# Patient Record
Sex: MALE | Race: WHITE | NOT HISPANIC OR LATINO | Employment: FULL TIME | URBAN - METROPOLITAN AREA
[De-identification: names, ages, dates, MRNs, and addresses within clinical notes are randomized per-mention and may not be internally consistent; named-entity substitution may affect disease eponyms.]

---

## 2017-01-31 ENCOUNTER — TELEPHONE (OUTPATIENT)
Dept: OTHER | Facility: CLINIC | Age: 52
End: 2017-01-31

## 2017-03-01 ENCOUNTER — OFFICE VISIT (OUTPATIENT)
Dept: URGENT CARE | Facility: URGENT CARE | Age: 52
End: 2017-03-01
Payer: COMMERCIAL

## 2017-03-01 VITALS
DIASTOLIC BLOOD PRESSURE: 78 MMHG | RESPIRATION RATE: 16 BRPM | HEART RATE: 71 BPM | WEIGHT: 248.8 LBS | TEMPERATURE: 98.6 F | SYSTOLIC BLOOD PRESSURE: 122 MMHG | OXYGEN SATURATION: 97 %

## 2017-03-01 DIAGNOSIS — J98.01 ACUTE BRONCHOSPASM: Primary | ICD-10-CM

## 2017-03-01 PROCEDURE — 99203 OFFICE O/P NEW LOW 30 MIN: CPT | Performed by: FAMILY MEDICINE

## 2017-03-01 RX ORDER — PREDNISONE 20 MG/1
20 TABLET ORAL DAILY
Qty: 5 TABLET | Refills: 0 | Status: SHIPPED | OUTPATIENT
Start: 2017-03-01 | End: 2020-12-08 | Stop reason: ALTCHOICE

## 2017-03-01 RX ORDER — ALBUTEROL SULFATE 90 UG/1
2 AEROSOL, METERED RESPIRATORY (INHALATION) EVERY 4 HOURS PRN
Qty: 1 INHALER | Refills: 0 | Status: SHIPPED | OUTPATIENT
Start: 2017-03-01 | End: 2020-12-08 | Stop reason: ALTCHOICE

## 2017-03-01 NOTE — MR AVS SNAPSHOT
"              After Visit Summary   3/1/2017    Aldair Mckeon    MRN: [id number]           Patient Information     Date Of Birth          1965        Visit Information        Provider Department      3/1/2017 8:05 PM Maxine Goncalves MD [facility]        Today's Diagnoses     Acute bronchospasm    -  1       Follow-ups after your visit        Who to contact     If you have questions or need follow up information about today's clinic visit or your schedule please contact [facility] directly at [phone].  Normal or non-critical lab and imaging results will be communicated to you by MyChart, letter or phone within 4 business days after the clinic has received the results. If you do not hear from us within 7 days, please contact the clinic through [email or web address] or phone. If you have a critical or abnormal lab result, we will notify you by phone as soon as possible.  Submit refill requests through [employer] or call your pharmacy and they will forward the refill request to us. Please allow 3 business days for your refill to be completed.          Additional Information About Your Visit        MyChart Information     [employer] lets you send messages to your doctor, view your test results, renew your prescriptions, schedule appointments and more. To sign up, go to [email or web address] . Click on \"Log in\" on the left side of the screen, which will take you to the Welcome page. Then click on \"Sign up Now\" on the right side of the page.     You will be asked to enter the access code listed below, as well as some personal information. Please follow the directions to create your username and password.     Your access code is: [email or web address]  Expires: 2017  8:52 PM     Your access code will  in 90 days. If you need help or a new code, please call your [facility] or [phone].        Care EveryWhere ID     This is your Care EveryWhere ID. This could be used by other " organizations to access your [place] medical records  [id number]        Your Vitals Were     Pulse Temperature Respirations Pulse Oximetry          71 98.6  F (37  C) (Oral) 16 97%         Blood Pressure from Last 3 Encounters:   03/01/17 122/78    Weight from Last 3 Encounters:   03/01/17 248 lb 12.8 oz (112.9 kg)              Today, you had the following     No orders found for display         Today's Medication Changes          These changes are accurate as of: 3/1/17  8:52 PM.  If you have any questions, ask your nurse or doctor.               Start taking these medicines.        Dose/Directions    albuterol 108 (90 BASE) MCG/ACT Inhaler   Commonly known as:  PROAIR HFA/PROVENTIL HFA/VENTOLIN HFA   Used for:  Acute bronchospasm   Started by:  Maxine Goncalves MD        Dose:  2 puff   Inhale 2 puffs into the lungs every 4 hours as needed for shortness of breath / dyspnea or wheezing Use with spacer   Quantity:  1 Inhaler   Refills:  0       predniSONE 20 MG tablet   Commonly known as:  DELTASONE   Used for:  Acute bronchospasm   Started by:  Maxine Goncalves MD        Dose:  20 mg   Take 1 tablet (20 mg) by mouth daily   Quantity:  5 tablet   Refills:  0       spacer/aero-hold chamber mask Chacha   Used for:  Acute bronchospasm   Started by:  Maxine Goncalves MD        1 Adult size spacer to use with MDI inhalers.   Quantity:  1 each   Refills:  0            Where to get your medicines      These medications were sent to [facility] [address] [place] [address] [address] [place] [address] AT SEC of [facility]  [address] [facility] [address]     Phone:  [phone]     albuterol 108 (90 BASE) MCG/ACT Inhaler    predniSONE 20 MG tablet    spacer/aero-hold chamber mask Chacha                Primary Care Provider Office Phone # Fax #    [facility] [phone] [address] [facility]. [facility] [address]        Thank you!     Thank you for  choosing [facility]  [facility]. Our goal is always to provide you with excellent care. Hearing back from our patients is one way we can continue to improve our services. Please take a few minutes to complete the written survey that you may receive in the mail after your visit with us. Thank you!             Your Updated Medication List - Protect others around you: Learn how to safely use, store and throw away your medicines at www.disposemymeds.org.          This list is accurate as of: 3/1/17  8:52 PM.  Always use your most recent med list.                   Brand Name Dispense Instructions for use    albuterol 108 (90 BASE) MCG/ACT Inhaler    PROAIR HFA/PROVENTIL HFA/VENTOLIN HFA    1 Inhaler    Inhale 2 puffs into the lungs every 4 hours as needed for shortness of breath / dyspnea or wheezing Use with spacer       aspirin 81 MG tablet      Take 81 mg by mouth daily       predniSONE 20 MG tablet    DELTASONE    5 tablet    Take 1 tablet (20 mg) by mouth daily       spacer/aero-hold chamber mask Chacha     1 each    1 Adult size spacer to use with MDI inhalers.

## 2017-03-02 NOTE — NURSING NOTE
Chief Complaint   Patient presents with     Cough     Exposure to chemical, irritated        Initial /78  Pulse 71  Temp 98.6  F (37  C) (Oral)  Resp 16  Wt 248 lb 12.8 oz (112.9 kg)  SpO2 97% There is no height or weight on file to calculate BMI.  Medication Reconciliation: complete   Krystin Dickey CMA

## 2017-03-02 NOTE — PROGRESS NOTES
SUBJECTIVE:                                                    Aldair Mckeon is a 51 year old male who presents to clinic today for the following health issues:      Respiratory Symptoms      Duration: X 1 hour    Description: shortness of breath, cough    Intensity:  moderate    Accompanying signs and symptoms: None    History (similar episodes/previous evaluation): None    Precipitating or alleviating factors: None    Therapies tried and outcome: None    Was using Roto Router and inhaled, and lungs got irritated and started coughing.     The sink was clogged patient used some roto router (chemical) and placed it in there  Patient put couple of doses  As he checked on it he had a whiff of it and start coughing and wheezing.  everytime he takes a deep breath he would cough  Patient opened up the windows and tried to air it out.  It was a lot worse initially to the point that he could barely cough  But now is improving but still feels tight when he takes in deep breathe.    Has had inhalers in the past which he sometimes needs when he gets a bronchitis    Patient is a .     Fever No  Sinus congestion/sinus pain No  Wheezing: Yes  Chest pain or exertional shortness of breath: NONE EXERTIONAL  Exposure to pertussis or pertussis like symptoms: No  Orthopnea, worsening edema, pnd: NO  Rash: NO  Tried OTC medications without relief  No hemoptysis.  Worsening symptoms hence patient came in to be seen     Problem list and histories reviewed & adjusted, as indicated.  No known medical problems  Additional history: as documented    Problem list, Medication list, Allergies, and Medical/Social/Surgical histories reviewed in [facility] and updated as appropriate.    ROS:  Constitutional, HEENT, cardiovascular, pulmonary, gi and gu systems are negative, except as otherwise noted.    OBJECTIVE:                                                    /78  Pulse 71  Temp 98.6  F (37  C) (Oral)  Resp 16  Wt 248 lb  12.8 oz (112.9 kg)  SpO2 97%  There is no height or weight on file to calculate BMI.  GENERAL: healthy, alert and no distress  EYES: pink palpebral conjunctiva, anicteric sclera  ENT: midline nasal septum normal ear exam. Normal  sinuses.   Mouth: moist buccal mucosa slight hyperemic posterior pharyngeal wall. No tonsillar enlargement or cellulitis  NECK: no adenopathy, no asymmetry, masses, or scars and thyroid normal to palpation  RESP: lungs clear to auscultation - No  rales, rhonchi  Occasional end expiratory wheeze.  CV: regular rate and rhythm, normal S1 S2, no S3 or S4,  No murmurs, click or rub  SKIN: no visible rashes noted  Pscyh: Appropriate mood and affect  MS: no gross musculoskeletal defects noted    Diagnostic Test Results:  none      ASSESSMENT/PLAN:                                                        ICD-10-CM    1. Acute bronchospasm J98.01 albuterol (PROAIR HFA/PROVENTIL HFA/VENTOLIN HFA) 108 (90 BASE) MCG/ACT Inhaler     Spacer/Aero-Holding Chambers (SPACER/AERO-HOLD CHAMBER MASK) LENNY     predniSONE (DELTASONE) 20 MG tablet     Prescribed with albuterol as needed wheezing and chest tightness  Prescribed with prednisone low dose for tonight. If symptoms remarkably improved tomorrow may discontinue.  Avoid recurrent exposure.  Adverse reactions of medications discussed.  Over the counter medications discussed.   Aware to come back in if with worsening symptoms or if no relief despite treatment plan  Patient voiced understanding and had no further questions.     MD Maxine Wu MD  [facility]

## 2020-12-08 ENCOUNTER — VIRTUAL VISIT (OUTPATIENT)
Dept: FAMILY MEDICINE | Facility: CLINIC | Age: 55
End: 2020-12-08
Payer: COMMERCIAL

## 2020-12-08 DIAGNOSIS — J01.90 ACUTE NON-RECURRENT SINUSITIS, UNSPECIFIED LOCATION: Primary | ICD-10-CM

## 2020-12-08 DIAGNOSIS — R05.9 COUGH: ICD-10-CM

## 2020-12-08 PROCEDURE — U0003 INFECTIOUS AGENT DETECTION BY NUCLEIC ACID (DNA OR RNA); SEVERE ACUTE RESPIRATORY SYNDROME CORONAVIRUS 2 (SARS-COV-2) (CORONAVIRUS DISEASE [COVID-19]), AMPLIFIED PROBE TECHNIQUE, MAKING USE OF HIGH THROUGHPUT TECHNOLOGIES AS DESCRIBED BY CMS-2020-01-R: HCPCS | Performed by: NURSE PRACTITIONER

## 2020-12-08 PROCEDURE — 99213 OFFICE O/P EST LOW 20 MIN: CPT | Mod: TEL | Performed by: NURSE PRACTITIONER

## 2020-12-08 RX ORDER — AMOXICILLIN 500 MG/1
1000 CAPSULE ORAL 3 TIMES DAILY
Qty: 60 CAPSULE | Refills: 0 | Status: SHIPPED | OUTPATIENT
Start: 2020-12-08 | End: 2020-12-18

## 2020-12-08 ASSESSMENT — ENCOUNTER SYMPTOMS
DIAPHORESIS: 1
SINUS PRESSURE: 1
ARTHRALGIAS: 1
SORE THROAT: 1
RHINORRHEA: 1
NAUSEA: 0
FATIGUE: 1
APPETITE CHANGE: 1
SHORTNESS OF BREATH: 0
HEADACHES: 1
MYALGIAS: 1
CHILLS: 1
WHEEZING: 0
COUGH: 1
FEVER: 0
VOMITING: 0
SINUS PAIN: 1
DIARRHEA: 1
EYE DISCHARGE: 0

## 2020-12-08 NOTE — LETTER
[facility]  [address] [place] [place] [address]  Phone: [phone]    December 8, 2020        Aldair Mckeon  [address] [facility] [address]        RE: Aldair Mckeon    Patient was seen and treated today at our clinic.  Please excuse him from work during his illness.    Aldair need to stay home until all three of these things are true:     Aldair feel better. Your cough, shortness of breath, or other symptoms are better.  and  It has been 10 days since you first felt sick.  and  Aldair have had no fever for at least 24 hours, without using medicine that lowers fevers.     If a lab test shows you do not have COVID-19 but you have symptoms, stay home until your symptoms are better and you do not have a fever.     Please contact me for questions or concerns.      Sincerely,        BEAU Lebron CNP

## 2020-12-08 NOTE — PROGRESS NOTES
"Aldair Mckeon is a 55 year old male who is being evaluated via a billable telephone visit.      The patient has been notified of following:     \"This telephone visit will be conducted via a call between you and your physician/provider. We have found that certain health care needs can be provided without the need for a physical exam.  This service lets us provide the care you need with a short phone conversation.  If a prescription is necessary we can send it directly to your pharmacy.  If lab work is needed we can place an order for that and you can then stop by our lab to have the test done at a later time.    Telephone visits are billed at different rates depending on your insurance coverage. During this emergency period, for some insurers they may be billed the same as an in-person visit.  Please reach out to your insurance provider with any questions.    If during the course of the call the physician/provider feels a telephone visit is not appropriate, you will not be charged for this service.\"    Patient has given verbal consent for Telephone visit?  Yes    What phone number would you like to be contacted at? [phone]    How would you like to obtain your AVS? Mail a copy    Subjective     Aldair Mckeon is a 55 year old male who presents via phone visit today for the following health issues:    HPI     Acute Illness  Acute illness concerns: Sinus infection  Onset/Duration: 1 week   Symptoms:  Fever: no  Chills/Sweats: YES  Headache (location?): YES  Sinus Pressure: YES  Conjunctivitis:  no  Ear Pain: YES: right  Rhinorrhea: YES  Congestion: no  Sore Throat: YES  Cough: YES  Wheeze: no  Decreased Appetite: YES  Nausea: no  Vomiting: no  Diarrhea: YES  Dysuria/Freq.: no  Dysuria or Hematuria: no  Fatigue/Achiness: YES  Sick/Strep Exposure: YES -      Therapies tried and outcome: OTC medication with little relief     Phone call completed due to COVID-19 outbreak.     Has not been feeling well " for the last week. Runny nose and cough started first. Was fatigued from working the week before. Is a  with [employer]. No shortness of breath. Sore throat in the AMs when wakes. Sore throat will get better during the day. Right ear is hurting. Has been using netti pot. When ear starts hurting directly related to doing the netti pot. Feels like is not as stable as has been on feet as has been in the past. Feels like balance is off a bit. 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 Taste is fine. No COVID exposure that is aware of. Is to work today and the next 3 days.       Review of Systems   Constitutional: Positive for appetite change (decreased), chills, diaphoresis and fatigue. Negative for fever.   HENT: Positive for ear pain, rhinorrhea, sinus pressure, sinus pain and sore throat. Negative for congestion.    Eyes: Negative for discharge.   Respiratory: Positive for cough. Negative for shortness of breath and wheezing.    Cardiovascular: Negative for chest pain.   Gastrointestinal: Positive for diarrhea. Negative for nausea and vomiting.   Musculoskeletal: Positive for arthralgias and myalgias.   Neurological: Positive for headaches.           Objective          Vitals:  No vitals were obtained today due to virtual visit.    healthy, alert and no distress  PSYCH: Alert and oriented times 3; coherent speech, normal   rate and volume, able to articulate logical thoughts, able   to abstract reason, no tangential thoughts, no hallucinations   or delusions  His affect is normal and pleasant  RESP: No cough, no audible wheezing, able to talk in full sentences  Remainder of exam unable to be completed due to telephone visits          Assessment & Plan     Acute non-recurrent sinusitis, unspecified location  We will treat empirically today based on symptoms of face pressure, ear pain and drainage for sinus infection.  Educated on use of antibiotic.  Notify if no improvement.  - amoxicillin (AMOXIL) 500 MG  capsule; Take 2 capsules (1,000 mg) by mouth 3 times daily for 10 days    Cough  Based on symptoms and possible exposure potential will set up for COVID-19 testing.  Rest, drink plenty of fluids.  Encouraged to self isolate/quarantine.  Work note written.  - Symptomatic COVID-19 Virus (Coronavirus) by PCR; Future        No follow-ups on file.    BEAU Lebron [facility]    Phone call duration:  10 minutes

## 2020-12-08 NOTE — PATIENT INSTRUCTIONS
Instructions for Patients  Your symptoms show that you may have coronavirus (COVID-19). This illness can cause fever, cough and trouble breathing. Many people get a mild case and get better on their own. Some people can get very sick.     Not all patients are tested for COVID-19. If you need to be tested, your care team will let you know.    How can I protect others?    Without a test, we can t know for sure that you have COVID-19. For safety, it s very important to follow these rules.    Stay home and away from others (self-isolate) until:    You ve had no fever--and no medicine that reduces fever--for 1 full day (24 hours), And     Your other symptoms have resolved (gotten better). For example, your cough or breathing has improved, And     At least 10 days have passed since your symptoms started.    During this time:    Stay in your own room (and use your own bathroom), if you can.    Stay away from others in your home. No hugging, kissing or shaking hands.    No visitors.    Don t go to work, school or anywhere else.     Clean  high touch  surfaces often (doorknobs, counters, handles, etc.). Use a household cleaning spray or wipes.    Cover your mouth and nose with a mask, tissue or washcloth to avoid spreading germs.    Wash your hands and face often. Use soap and water.    For more tips, go to https://www.cdc.gov/coronavirus/2019-ncov/downloads/10Things.pdf.    How can I take care of myself?    1. Get lots of rest. Drink extra fluids (unless a doctor has told you not to).     2. Take Tylenol (acetaminophen) for fever or pain. If you have liver or kidney problems, ask your family doctor if it's okay to take Tylenol.     Adults can take either:     650 mg (two 325 mg pills) every 4 to 6 hours, or     1,000 mg (two 500 mg pills) every 8 hours as needed.     Note: Don't take more than 3,000 mg in one day.   Acetaminophen is found in many medicines (both prescribed and over-the-counter medicines). Read all labels to  be sure you don't take too much.   For children, check the Tylenol bottle for the right dose. The dose is based on  the child's age or weight.    3. If you have other health problems (like cancer, heart failure, an organ transplant or severe kidney disease): Call your specialty clinic if you don't feel better in the next 2 days.    4. Know when to call 911: If your breathing is so bad that it keeps you from doing normal activities, call 911 or go to the emergency room. Tell them that you've been staying home and may have COVID-19.      Thank you for taking steps to prevent the spread of this virus.  o Limit your contact with others.  o Wear a simple mask to cover your cough.  o Wash your hands well and often.  o If you need medical care, go to [email or web address] or contact your health care provider.     For more about COVID-19 and caring for yourself at home, visit the CDC website at https://www.cdc.gov/coronavirus/2019-ncov/about/steps-when-sick.html.     To learn about care at [facility], please go to [email or web address].     [facility] clinical trials (COVID-19 research studies): [email or web address]-clinical-trials.    Below are the COVID-19 hotlines at the [facility] of Health ([facility]). Interpreters are available.     For health questions: Call [phone] or 1-556.489.9003 (7 a.m. to 7 p.m.)    For questions about schools and childcare: Call [phone] or [phone] (7 a.m. to 7 p.m.)

## 2020-12-09 LAB
SARS-COV-2 RNA SPEC QL NAA+PROBE: ABNORMAL
SPECIMEN SOURCE: ABNORMAL

## 2021-01-14 ENCOUNTER — HEALTH MAINTENANCE LETTER (OUTPATIENT)
Age: 56
End: 2021-01-14

## 2021-03-01 ENCOUNTER — TELEPHONE (OUTPATIENT)
Dept: FAMILY MEDICINE | Facility: CLINIC | Age: 56
End: 2021-03-01

## 2021-03-01 NOTE — LETTER
March 1, 2021      Aldair Mckeon  [address] [facility] [address]      Dear Aldair Mckeon    We care about your health and have reviewed your health plan including your medical conditions, medication list, and lab results.  Based on this review, it is recommended that you follow up regarding the following health topic(s):     -Colon Cancer Screening  -Wellness (Physical) Visit     We recommend you take the following action(s):  -schedule a WELLNESS (Physical) APPOINTMENT.  We will perform the following labs: Lipids (fasting cholesterol - nothing to eat except water and/or meds for 8-10 hours).    At your physical we will plan to further discuss your health maintenance items that are due.    Please call us at [phone] (or use [employer]) to address the above recommendations.     Thank you for trusting [facility] and we appreciate the opportunity to serve you.  We look forward to supporting your healthcare needs in the future.    Healthy Regards,      Your Health Care Team

## 2021-03-01 NOTE — TELEPHONE ENCOUNTER
Panel Management Review      Patient has the following on his problem list: None      Composite cancer screening  Chart review shows that this patient is due/due soon for the following Colonoscopy  Summary:    Patient is due/failing the following:     Health Maintenance Due   Topic Date Due     PREVENTIVE CARE VISIT  1965     ADVANCE CARE PLANNING  1965     COLORECTAL CANCER SCREENING  04/12/1975     HIV SCREENING  04/12/1980     HEPATITIS C SCREENING  04/12/1983     LIPID  04/12/2000     ZOSTER IMMUNIZATION (1 of 2) 04/12/2015     INFLUENZA VACCINE (1) 09/01/2020     PHQ-2  01/01/2021         Action needed:   Patient needs office visit for physical with fasting labs and discuss rest of health maintenance.    Type of outreach:    Sent letter.    Questions for provider review:    None                                                                                                                                    April TOMASA Hope       Chart routed to none .

## 2021-04-13 ENCOUNTER — OFFICE VISIT (OUTPATIENT)
Dept: FAMILY MEDICINE | Facility: CLINIC | Age: 56
End: 2021-04-13
Payer: COMMERCIAL

## 2021-04-13 VITALS
DIASTOLIC BLOOD PRESSURE: 82 MMHG | OXYGEN SATURATION: 96 % | RESPIRATION RATE: 18 BRPM | WEIGHT: 249 LBS | HEART RATE: 102 BPM | HEIGHT: 72 IN | TEMPERATURE: 97.9 F | SYSTOLIC BLOOD PRESSURE: 117 MMHG | BODY MASS INDEX: 33.72 KG/M2

## 2021-04-13 DIAGNOSIS — R33.9 INABILITY TO URINATE: ICD-10-CM

## 2021-04-13 DIAGNOSIS — K64.4 EXTERNAL HEMORRHOIDS: ICD-10-CM

## 2021-04-13 DIAGNOSIS — N41.0 ACUTE PROSTATITIS: Primary | ICD-10-CM

## 2021-04-13 DIAGNOSIS — Z12.12 SCREENING FOR MALIGNANT NEOPLASM OF THE RECTUM: ICD-10-CM

## 2021-04-13 LAB
ALBUMIN UR-MCNC: ABNORMAL MG/DL
APPEARANCE UR: CLEAR
BILIRUB UR QL STRIP: NEGATIVE
COLOR UR AUTO: YELLOW
GLUCOSE UR STRIP-MCNC: NEGATIVE MG/DL
HGB UR QL STRIP: NEGATIVE
KETONES UR STRIP-MCNC: NEGATIVE MG/DL
LEUKOCYTE ESTERASE UR QL STRIP: NEGATIVE
MUCOUS THREADS #/AREA URNS LPF: PRESENT /LPF
NITRATE UR QL: NEGATIVE
PH UR STRIP: 6 PH (ref 5–7)
RBC #/AREA URNS AUTO: ABNORMAL /HPF
SOURCE: ABNORMAL
SP GR UR STRIP: 1.02 (ref 1–1.03)
UROBILINOGEN UR STRIP-ACNC: 0.2 EU/DL (ref 0.2–1)
WBC #/AREA URNS AUTO: ABNORMAL /HPF

## 2021-04-13 PROCEDURE — 99213 OFFICE O/P EST LOW 20 MIN: CPT | Performed by: PHYSICIAN ASSISTANT

## 2021-04-13 PROCEDURE — 81001 URINALYSIS AUTO W/SCOPE: CPT | Performed by: PHYSICIAN ASSISTANT

## 2021-04-13 RX ORDER — TAMSULOSIN HYDROCHLORIDE 0.4 MG/1
0.4 CAPSULE ORAL DAILY
Qty: 14 CAPSULE | Refills: 0 | Status: SHIPPED | OUTPATIENT
Start: 2021-04-13 | End: 2021-04-27

## 2021-04-13 RX ORDER — DOXYCYCLINE 100 MG/1
100 CAPSULE ORAL 2 TIMES DAILY
Qty: 42 CAPSULE | Refills: 0 | Status: SHIPPED | OUTPATIENT
Start: 2021-04-13 | End: 2021-05-04

## 2021-04-13 ASSESSMENT — MIFFLIN-ST. JEOR: SCORE: 1997.46

## 2021-04-13 NOTE — PROGRESS NOTES
Assessment & Plan       ICD-10-CM    1. Acute prostatitis  N41.0 doxycycline hyclate (VIBRAMYCIN) 100 MG capsule     Urine Microscopic     tamsulosin (FLOMAX) 0.4 MG capsule   2. Inability to urinate  R33.9 *UA reflex to Microscopic and Culture ([place] [facility] (except [place] and [place])   3. External hemorrhoids  K64.4    4. Screening for malignant neoplasm of the rectum  Z12.12 GASTROENTEROLOGY ADULT REF PROCEDURE ONLY   I suspect based on his symptoms and exam that he has acute prostatitis.  We will start him on doxycycline 100 mg twice a day for 3 weeks.  Warning signs of side effects were discussed.  If his symptoms are not improving he may need to be seen in the emergency room.    Return in about 2 days (around 4/15/2021).    DEANNA Garcia [facility]    Marsha Quinteros is a 56 year old who presents for the following health issues     HPI     Genitourinary - Male  Onset/Duration: 4 days   Description:   Dysuria (painful urination): YES}  Hematuria (blood in urine): no  Frequency: no  Waking at night to urinate: YES  Hesitancy (delay in urine): YES  Retention (unable to empty): YES  Decrease in urinary flow: YES  Incontinence: no  Progression of Symptoms:  worsening  Accompanying Signs & Symptoms:  Fever: no  Back/Flank pain: YES  Urethral discharge: no  Testicle lumps/masses/pain: no  Nausea and/or vomiting: no  Abdominal pain: no  History:   History of frequent UTI s: no  History of kidney stones: YES  History of hernias: no  Personal or Family history of Prostate problems: YES- father thinks to have prostate cancer  Precipitating or alleviating factors: possible hemorrhoids/bowel issues - hemorrhoids in the past.   Therapies tried and outcome: used a suppository for above issues and that helped him urinate   Feels swollen around rectum.   3 days ago more the urge to have a BM and couldn't. Barely can urinate.     Can't get in a comfortable position.  3/10 constant pain. Sitting worse.     Last colonoscopy 5 yrs ago with [facility]. History of colon polyps.     Review of Systems   Constitutional, HEENT, cardiovascular, pulmonary, gi and gu systems are negative, except as otherwise noted.      Objective    /82   Pulse 102   Temp 97.9  F (36.6  C) (Tympanic)   Resp 18   Ht 1.829 m (6')   Wt 112.9 kg (249 lb)   SpO2 96%   BMI 33.77 kg/m    Body mass index is 33.77 kg/m .  Physical Exam   GENERAL: healthy, alert and no distress appears uncomfortable pacing around room.    (male): normal male genitalia without lesions or urethral discharge, no hernia  RECTAL (male): normal sphincter tone, small external nontender rectal masses, prostate unable to feel but he felt pressure during exam.     Results for orders placed or performed in visit on 04/13/21   *UA reflex to Microscopic and Culture ([place] [facility] (except [place] and [place])     Status: Abnormal    Specimen: Midstream Urine   Result Value Ref Range    Color Urine Yellow     Appearance Urine Clear     Glucose Urine Negative NEG^Negative mg/dL    Bilirubin Urine Negative NEG^Negative    Ketones Urine Negative NEG^Negative mg/dL    Specific Gravity Urine 1.025 1.003 - 1.035    Blood Urine Negative NEG^Negative    pH Urine 6.0 5.0 - 7.0 pH    Protein Albumin Urine Trace (A) NEG^Negative mg/dL    Urobilinogen Urine 0.2 0.2 - 1.0 EU/dL    Nitrite Urine Negative NEG^Negative    Leukocyte Esterase Urine Negative NEG^Negative    Source Midstream Urine    Urine Microscopic     Status: Abnormal   Result Value Ref Range    WBC Urine 0 - 5 OTO5^0 - 5 /HPF    RBC Urine O - 2 OTO2^O - 2 /HPF    Mucous Urine Present (A) NEG^Negative /LPF

## 2021-04-13 NOTE — LETTER
[facility]  [address] [facility] [address]  Phone: [phone]    April 13, 2021        Aldair Mckeon  [address] [facility] [address]          To whom it may concern:    RE: Aldair Mckeon    Patient was seen and treated today at our clinic and missed work.    Please contact me for questions or concerns.      Sincerely,        Doni Albarran PA-C

## 2021-04-15 ENCOUNTER — TRANSFERRED RECORDS (OUTPATIENT)
Dept: HEALTH INFORMATION MANAGEMENT | Facility: CLINIC | Age: 56
End: 2021-04-15

## 2021-05-13 DIAGNOSIS — Z11.59 ENCOUNTER FOR SCREENING FOR OTHER VIRAL DISEASES: ICD-10-CM

## 2021-05-24 DIAGNOSIS — Z11.59 ENCOUNTER FOR SCREENING FOR OTHER VIRAL DISEASES: ICD-10-CM

## 2021-05-24 LAB
SARS-COV-2 RNA RESP QL NAA+PROBE: NORMAL
SPECIMEN SOURCE: NORMAL

## 2021-05-24 PROCEDURE — U0005 INFEC AGEN DETEC AMPLI PROBE: HCPCS | Performed by: SURGERY

## 2021-05-24 PROCEDURE — U0003 INFECTIOUS AGENT DETECTION BY NUCLEIC ACID (DNA OR RNA); SEVERE ACUTE RESPIRATORY SYNDROME CORONAVIRUS 2 (SARS-COV-2) (CORONAVIRUS DISEASE [COVID-19]), AMPLIFIED PROBE TECHNIQUE, MAKING USE OF HIGH THROUGHPUT TECHNOLOGIES AS DESCRIBED BY CMS-2020-01-R: HCPCS | Performed by: SURGERY

## 2021-05-25 LAB
LABORATORY COMMENT REPORT: NORMAL
SARS-COV-2 RNA RESP QL NAA+PROBE: NEGATIVE
SPECIMEN SOURCE: NORMAL

## 2021-05-27 ENCOUNTER — HOSPITAL ENCOUNTER (OUTPATIENT)
Facility: AMBULATORY SURGERY CENTER | Age: 56
Discharge: HOME | End: 2021-05-27
Attending: SURGERY | Admitting: SURGERY
Payer: COMMERCIAL

## 2021-05-27 VITALS
HEART RATE: 84 BPM | TEMPERATURE: 97.5 F | RESPIRATION RATE: 16 BRPM | OXYGEN SATURATION: 96 % | SYSTOLIC BLOOD PRESSURE: 126 MMHG | DIASTOLIC BLOOD PRESSURE: 86 MMHG

## 2021-05-27 LAB — COLONOSCOPY: NORMAL

## 2021-05-27 PROCEDURE — 88305 TISSUE EXAM BY PATHOLOGIST: CPT | Performed by: PATHOLOGY

## 2021-05-27 PROCEDURE — G8918 PT W/O PREOP ORDER IV AB PRO: HCPCS

## 2021-05-27 PROCEDURE — 45385 COLONOSCOPY W/LESION REMOVAL: CPT

## 2021-05-27 PROCEDURE — 99152 MOD SED SAME PHYS/QHP 5/>YRS: CPT | Mod: 59 | Performed by: SURGERY

## 2021-05-27 PROCEDURE — 45385 COLONOSCOPY W/LESION REMOVAL: CPT | Mod: PT | Performed by: SURGERY

## 2021-05-27 PROCEDURE — G8907 PT DOC NO EVENTS ON DISCHARG: HCPCS

## 2021-05-27 RX ORDER — FLUMAZENIL 0.1 MG/ML
0.2 INJECTION, SOLUTION INTRAVENOUS
Status: DISCONTINUED | OUTPATIENT
Start: 2021-05-27 | End: 2021-05-28 | Stop reason: HOSPADM

## 2021-05-27 RX ORDER — LIDOCAINE 40 MG/G
CREAM TOPICAL
Status: DISCONTINUED | OUTPATIENT
Start: 2021-05-27 | End: 2021-05-28 | Stop reason: HOSPADM

## 2021-05-27 RX ORDER — NALOXONE HYDROCHLORIDE 0.4 MG/ML
0.2 INJECTION, SOLUTION INTRAMUSCULAR; INTRAVENOUS; SUBCUTANEOUS
Status: DISCONTINUED | OUTPATIENT
Start: 2021-05-27 | End: 2021-05-28 | Stop reason: HOSPADM

## 2021-05-27 RX ORDER — PROCHLORPERAZINE MALEATE 10 MG
10 TABLET ORAL EVERY 6 HOURS PRN
Status: DISCONTINUED | OUTPATIENT
Start: 2021-05-27 | End: 2021-05-28 | Stop reason: HOSPADM

## 2021-05-27 RX ORDER — NALOXONE HYDROCHLORIDE 0.4 MG/ML
0.4 INJECTION, SOLUTION INTRAMUSCULAR; INTRAVENOUS; SUBCUTANEOUS
Status: DISCONTINUED | OUTPATIENT
Start: 2021-05-27 | End: 2021-05-28 | Stop reason: HOSPADM

## 2021-05-27 RX ORDER — ONDANSETRON 2 MG/ML
4 INJECTION INTRAMUSCULAR; INTRAVENOUS EVERY 6 HOURS PRN
Status: DISCONTINUED | OUTPATIENT
Start: 2021-05-27 | End: 2021-05-28 | Stop reason: HOSPADM

## 2021-05-27 RX ORDER — ONDANSETRON 4 MG/1
4 TABLET, ORALLY DISINTEGRATING ORAL EVERY 6 HOURS PRN
Status: DISCONTINUED | OUTPATIENT
Start: 2021-05-27 | End: 2021-05-28 | Stop reason: HOSPADM

## 2021-05-27 RX ORDER — FENTANYL CITRATE 50 UG/ML
INJECTION, SOLUTION INTRAMUSCULAR; INTRAVENOUS PRN
Status: DISCONTINUED | OUTPATIENT
Start: 2021-05-27 | End: 2021-05-27 | Stop reason: HOSPADM

## 2021-05-27 RX ORDER — ONDANSETRON 2 MG/ML
4 INJECTION INTRAMUSCULAR; INTRAVENOUS
Status: DISCONTINUED | OUTPATIENT
Start: 2021-05-27 | End: 2021-05-28 | Stop reason: HOSPADM

## 2021-06-01 LAB — COPATH REPORT: NORMAL

## 2021-06-11 ENCOUNTER — MYC MEDICAL ADVICE (OUTPATIENT)
Dept: FAMILY MEDICINE | Facility: CLINIC | Age: 56
End: 2021-06-11

## 2021-10-24 ENCOUNTER — HEALTH MAINTENANCE LETTER (OUTPATIENT)
Age: 56
End: 2021-10-24

## 2022-02-13 ENCOUNTER — HEALTH MAINTENANCE LETTER (OUTPATIENT)
Age: 57
End: 2022-02-13

## 2022-07-14 ENCOUNTER — OFFICE VISIT (OUTPATIENT)
Dept: URGENT CARE | Facility: URGENT CARE | Age: 57
End: 2022-07-14
Payer: COMMERCIAL

## 2022-07-14 VITALS
OXYGEN SATURATION: 97 % | BODY MASS INDEX: 35.05 KG/M2 | HEART RATE: 85 BPM | RESPIRATION RATE: 12 BRPM | SYSTOLIC BLOOD PRESSURE: 122 MMHG | TEMPERATURE: 99.9 F | WEIGHT: 258.4 LBS | DIASTOLIC BLOOD PRESSURE: 80 MMHG

## 2022-07-14 DIAGNOSIS — R07.9 CHEST PAIN, UNSPECIFIED TYPE: Primary | ICD-10-CM

## 2022-07-14 PROCEDURE — 99214 OFFICE O/P EST MOD 30 MIN: CPT | Performed by: FAMILY MEDICINE

## 2022-07-14 PROCEDURE — 93000 ELECTROCARDIOGRAM COMPLETE: CPT | Performed by: FAMILY MEDICINE

## 2022-07-14 RX ORDER — IBUPROFEN 200 MG
200 TABLET ORAL EVERY 4 HOURS PRN
COMMUNITY
End: 2024-01-02

## 2022-07-15 NOTE — PROGRESS NOTES
Assessment & Plan     Chest pain, unspecified type  ddx suggests possible PE. He needs ER evaluation discussed safest/least risky means would be ambulance transfer but he will do private vehicle. He appears relatively stable for this. Stressed iimportance of no delay. Relayed info to ER at [facility].   - EKG 12-lead complete w/read - Clinics       [id number]}    No follow-ups on file.    Tripp Curran MD  [facility]    ------------------------------------------------------------------------  Subjective     Aldair Mckeon presents to clinic today for the following health issues:  chief complaint  HPI  Reports 4 days of symptoms including left calf pain early in the week then cough and right sided chest pain and shortness of breath starting. This may have improved a bit then today worsening shortness of breath particulary when driving. The calf pain and right sided chest pain continue as well. symptoms even present while at rest.     Review of Systems        Objective    /80   Pulse 85   Temp 99.9  F (37.7  C) (Tympanic)   Resp 12   Wt 117.2 kg (258 lb 6.4 oz)   SpO2 97%   BMI 35.05 kg/m    Physical Exam   GENERAL: healthy, alert and no distress  RESP: lungs clear to auscultation - no rales, rhonchi or wheezes  Cv: rrr  MS: right calf feels tight but no palpable cords. It is tender over the muscle belly.     ekg normal sinus rhythm.

## 2022-10-10 ENCOUNTER — HEALTH MAINTENANCE LETTER (OUTPATIENT)
Age: 57
End: 2022-10-10

## 2023-03-25 ENCOUNTER — HEALTH MAINTENANCE LETTER (OUTPATIENT)
Age: 58
End: 2023-03-25

## 2023-03-28 ENCOUNTER — ALLIED HEALTH/NURSE VISIT (OUTPATIENT)
Dept: FAMILY MEDICINE | Facility: CLINIC | Age: 58
End: 2023-03-28
Payer: COMMERCIAL

## 2023-03-28 DIAGNOSIS — Z23 NEED FOR TDAP VACCINATION: Primary | ICD-10-CM

## 2023-03-28 PROCEDURE — 90471 IMMUNIZATION ADMIN: CPT

## 2023-03-28 PROCEDURE — 99207 PR NO CHARGE NURSE ONLY: CPT

## 2023-03-28 PROCEDURE — 90715 TDAP VACCINE 7 YRS/> IM: CPT

## 2023-04-06 ASSESSMENT — ENCOUNTER SYMPTOMS
FEVER: 0
ARTHRALGIAS: 0
NAUSEA: 0
PALPITATIONS: 0
HEARTBURN: 0
JOINT SWELLING: 0
CHILLS: 0
WEAKNESS: 0
HEMATOCHEZIA: 0
DIZZINESS: 0
HEMATURIA: 0
CONSTIPATION: 0
HEADACHES: 0
DYSURIA: 0
PARESTHESIAS: 0
EYE PAIN: 0
NERVOUS/ANXIOUS: 0
DIARRHEA: 0
MYALGIAS: 0
SHORTNESS OF BREATH: 0
COUGH: 1
ABDOMINAL PAIN: 0
FREQUENCY: 1
SORE THROAT: 0

## 2023-04-13 ENCOUNTER — OFFICE VISIT (OUTPATIENT)
Dept: FAMILY MEDICINE | Facility: CLINIC | Age: 58
End: 2023-04-13
Payer: COMMERCIAL

## 2023-04-13 VITALS
WEIGHT: 263 LBS | RESPIRATION RATE: 16 BRPM | TEMPERATURE: 97.9 F | SYSTOLIC BLOOD PRESSURE: 134 MMHG | DIASTOLIC BLOOD PRESSURE: 80 MMHG | OXYGEN SATURATION: 97 % | HEART RATE: 72 BPM | HEIGHT: 72 IN | BODY MASS INDEX: 35.62 KG/M2

## 2023-04-13 DIAGNOSIS — Z00.00 ENCOUNTER FOR PREVENTIVE CARE: Primary | ICD-10-CM

## 2023-04-13 DIAGNOSIS — E66.01 MORBID OBESITY (H): ICD-10-CM

## 2023-04-13 DIAGNOSIS — G47.33 OSA (OBSTRUCTIVE SLEEP APNEA): ICD-10-CM

## 2023-04-13 DIAGNOSIS — R09.81 NASAL CONGESTION: ICD-10-CM

## 2023-04-13 PROCEDURE — 80061 LIPID PANEL: CPT | Performed by: PHYSICIAN ASSISTANT

## 2023-04-13 PROCEDURE — 80053 COMPREHEN METABOLIC PANEL: CPT | Performed by: PHYSICIAN ASSISTANT

## 2023-04-13 PROCEDURE — 99213 OFFICE O/P EST LOW 20 MIN: CPT | Mod: 25 | Performed by: PHYSICIAN ASSISTANT

## 2023-04-13 PROCEDURE — 36415 COLL VENOUS BLD VENIPUNCTURE: CPT | Performed by: PHYSICIAN ASSISTANT

## 2023-04-13 PROCEDURE — 86803 HEPATITIS C AB TEST: CPT | Performed by: PHYSICIAN ASSISTANT

## 2023-04-13 PROCEDURE — 99396 PREV VISIT EST AGE 40-64: CPT | Performed by: PHYSICIAN ASSISTANT

## 2023-04-13 PROCEDURE — G0103 PSA SCREENING: HCPCS | Performed by: PHYSICIAN ASSISTANT

## 2023-04-13 ASSESSMENT — ENCOUNTER SYMPTOMS
MYALGIAS: 0
ABDOMINAL PAIN: 0
NAUSEA: 0
DIZZINESS: 0
ARTHRALGIAS: 0
DIARRHEA: 0
COUGH: 1
HEARTBURN: 0
EYE PAIN: 0
CONSTIPATION: 0
PALPITATIONS: 0
SORE THROAT: 0
WEAKNESS: 0
SHORTNESS OF BREATH: 0
NERVOUS/ANXIOUS: 0
JOINT SWELLING: 0
FEVER: 0
HEMATOCHEZIA: 0
DYSURIA: 0
CHILLS: 0
PARESTHESIAS: 0
HEADACHES: 0
FREQUENCY: 1
HEMATURIA: 0

## 2023-04-13 ASSESSMENT — PAIN SCALES - GENERAL: PAINLEVEL: NO PAIN (0)

## 2023-04-13 NOTE — PROGRESS NOTES
SUBJECTIVE:   CC: Aldair is an 58 year old who presents for preventative health visit.       4/13/2023     2:31 PM   Additional Questions   Roomed by Amna   Accompanied by Self         4/13/2023     2:31 PM   Patient Reported Additional Medications   Patient reports taking the following new medications no new meds     Patient has been advised of split billing requirements and indicates understanding: Yes  Healthy Habits:     Getting at least 3 servings of Calcium per day:  Yes    Bi-annual eye exam:  Yes    Dental care twice a year:  NO    Sleep apnea or symptoms of sleep apnea:  Sleep apnea    Diet:  Regular (no restrictions)    Frequency of exercise:  2-3 days/week    Duration of exercise:  15-30 minutes    Taking medications regularly:  Yes    Medication side effects:  None    PHQ-2 Total Score: 0    Additional concerns today:  No    History of chronic nasal congestion and constant throat clearing and need to cough to clear throat.   History of deviated septum and had surgery with no help.   Increase symptoms with allergies season in summer for him.   occ use of antihistamine, nasal rinses.     Has BIPIN and uses CPAP.     Today's PHQ-2 Score:       4/6/2023    10:42 AM   PHQ-2 ( 1999 [employer])   Q1: Little interest or pleasure in doing things 0   Q2: Feeling down, depressed or hopeless 0   PHQ-2 Score 0   Q1: Little interest or pleasure in doing things Not at all   Q2: Feeling down, depressed or hopeless Not at all   PHQ-2 Score 0       Have you ever done Advance Care Planning? (For example, a Health Directive, POLST, or a discussion with a medical provider or your loved ones about your wishes): No, advance care planning information given to patient to review.  Patient declined advance care planning discussion at this time.    Social History     Tobacco Use     Smoking status: Never     Smokeless tobacco: Never   Vaping Use     Vaping status: Not on file   Substance Use Topics     Alcohol use: Not Currently              4/6/2023    10:41 AM   Alcohol Use   Prescreen: >3 drinks/day or >7 drinks/week? No       Last PSA: No results found for: PSA    Reviewed orders with patient. Reviewed health maintenance and updated orders accordingly - Yes  Lab work is in process  Labs reviewed in EPIC  BP Readings from Last 3 Encounters:   04/13/23 134/80   07/14/22 122/80   05/27/21 126/86    Wt Readings from Last 3 Encounters:   04/13/23 119.3 kg (263 lb)   07/14/22 117.2 kg (258 lb 6.4 oz)   04/13/21 112.9 kg (249 lb)                  Patient Active Problem List   Diagnosis     External hemorrhoids     BIPIN (obstructive sleep apnea)     Morbid obesity (H)     Past Surgical History:   Procedure Laterality Date     AS DRAINAGE RECTAL ABSCESS       COLONOSCOPY WITH CO2 INSUFFLATION N/A 5/27/2021    Procedure: COLONOSCOPY, WITH CO2 INSUFFLATION;  Surgeon: Parag Haider DO;  Location: [facility] OR     deviated septum       FINGER SURGERY       KNEE SURGERY       ORTHOPEDIC SURGERY      Right Knee ACL       Social History     Tobacco Use     Smoking status: Never     Smokeless tobacco: Never   Vaping Use     Vaping status: Not on file   Substance Use Topics     Alcohol use: Not Currently     Family History   Problem Relation Age of Onset     Diabetes Mother      Prostate Cancer Father 70         Current Outpatient Medications   Medication Sig Dispense Refill     aspirin 81 MG tablet Take 81 mg by mouth daily (Patient not taking: Reported on 7/14/2022)       ibuprofen (ADVIL/MOTRIN) 200 MG tablet Take 200 mg by mouth every 4 hours as needed for mild pain       No Known Allergies    Reviewed and updated as needed this visit by clinical staff   Tobacco  Allergies  Meds  Problems  Med Hx  Surg Hx  Fam Hx          Reviewed and updated as needed this visit by Provider   Tobacco  Allergies  Meds  Problems  Med Hx  Surg Hx  Fam Hx           Past Medical History:   Diagnosis Date     Sleep apnea       Past Surgical History:    Procedure Laterality Date     AS DRAINAGE RECTAL ABSCESS       COLONOSCOPY WITH CO2 INSUFFLATION N/A 5/27/2021    Procedure: COLONOSCOPY, WITH CO2 INSUFFLATION;  Surgeon: Parag Haider DO;  Location: [facility] OR     deviated septum       FINGER SURGERY       KNEE SURGERY       ORTHOPEDIC SURGERY      Right Knee ACL       Review of Systems   Constitutional: Negative for chills and fever.   HENT: Negative for congestion, ear pain, hearing loss and sore throat.    Eyes: Negative for pain and visual disturbance.   Respiratory: Positive for cough. Negative for shortness of breath.    Cardiovascular: Negative for chest pain, palpitations and peripheral edema.   Gastrointestinal: Negative for abdominal pain, constipation, diarrhea, heartburn, hematochezia and nausea.   Genitourinary: Positive for frequency. Negative for dysuria, genital sores, hematuria, impotence, penile discharge and urgency.   Musculoskeletal: Negative for arthralgias, joint swelling and myalgias.   Skin: Negative for rash.   Neurological: Negative for dizziness, weakness, headaches and paresthesias.   Psychiatric/Behavioral: Negative for mood changes. The patient is not nervous/anxious.        OBJECTIVE:   /80   Pulse 72   Temp 97.9  F (36.6  C) (Tympanic)   Resp 16   Ht 1.829 m (6')   Wt 119.3 kg (263 lb)   SpO2 97%   BMI 35.67 kg/m      Physical Exam  GENERAL: healthy, alert and no distress  EYES: Eyes grossly normal to inspection, PERRL and conjunctivae and sclerae normal  HENT: ear canals and TM's normal, nose and mouth without ulcers or lesions  NECK: no adenopathy, no asymmetry, masses, or scars and thyroid normal to palpation  RESP: lungs clear to auscultation - no rales, rhonchi or wheezes  CV: regular rate and rhythm, normal S1 S2, no S3 or S4, no murmur, click or rub, no peripheral edema and peripheral pulses strong  ABDOMEN: soft, nontender, no hepatosplenomegaly, no masses and bowel sounds normal   (male): normal  male genitalia without lesions or urethral discharge, no hernia  MS: no gross musculoskeletal defects noted, no edema  SKIN: no suspicious lesions or rashes  NEURO: Normal strength and tone, mentation intact and speech normal  PSYCH: mentation appears normal, affect normal/bright    Diagnostic Test Results:  Labs reviewed in Epic    ASSESSMENT/PLAN:       ICD-10-CM    1. Encounter for preventive care  Z00.00 Hepatitis C Screen Reflex to HCV RNA Quant and Genotype     Comprehensive metabolic panel (BMP + Alb, Alk Phos, ALT, AST, Total. Bili, TP)     Lipid panel reflex to direct LDL Fasting     PSA, screen     Hepatitis C Screen Reflex to HCV RNA Quant and Genotype     Comprehensive metabolic panel (BMP + Alb, Alk Phos, ALT, AST, Total. Bili, TP)     Lipid panel reflex to direct LDL Fasting     PSA, screen     CANCELED: Lipid panel reflex to direct LDL Non-fasting      2. Nasal congestion  R09.81 Adult ENT  Referral     OFFICE/OUTPT VISIT,EST,LEVL III      3. BIPIN (obstructive sleep apnea)  G47.33       4. Morbid obesity (H)  E66.01       1. Work on Healthy diet and exercise. Getting heart rate elevated for 30 mins most days of week.  Labs pending  2. Follow up  With ENT for 2nd opinion.      COUNSELING:   Reviewed preventive health counseling, as reflected in patient instructions       Regular exercise       Healthy diet/nutrition       Vision screening      BMI:   Estimated body mass index is 35.67 kg/m  as calculated from the following:    Height as of this encounter: 1.829 m (6').    Weight as of this encounter: 119.3 kg (263 lb).   Weight management plan: Discussed healthy diet and exercise guidelines      He reports that he has never smoked. He has never used smokeless tobacco.            DEANNA Garcia [facility]

## 2023-04-14 LAB
ALBUMIN SERPL-MCNC: 3.9 G/DL (ref 3.4–5)
ALP SERPL-CCNC: 80 U/L (ref 40–150)
ALT SERPL W P-5'-P-CCNC: 52 U/L (ref 0–70)
ANION GAP SERPL CALCULATED.3IONS-SCNC: 2 MMOL/L (ref 3–14)
AST SERPL W P-5'-P-CCNC: 32 U/L (ref 0–45)
BILIRUB SERPL-MCNC: 0.5 MG/DL (ref 0.2–1.3)
BUN SERPL-MCNC: 13 MG/DL (ref 7–30)
CALCIUM SERPL-MCNC: 8.1 MG/DL (ref 8.5–10.1)
CHLORIDE BLD-SCNC: 111 MMOL/L (ref 94–109)
CHOLEST SERPL-MCNC: 184 MG/DL
CO2 SERPL-SCNC: 25 MMOL/L (ref 20–32)
CREAT SERPL-MCNC: 0.84 MG/DL (ref 0.66–1.25)
FASTING STATUS PATIENT QL REPORTED: NO
GFR SERPL CREATININE-BSD FRML MDRD: >90 ML/MIN/1.73M2
GLUCOSE BLD-MCNC: 115 MG/DL (ref 70–99)
HCV AB SERPL QL IA: NONREACTIVE
HDLC SERPL-MCNC: 29 MG/DL
LDLC SERPL CALC-MCNC: 94 MG/DL
NONHDLC SERPL-MCNC: 155 MG/DL
POTASSIUM BLD-SCNC: 3.8 MMOL/L (ref 3.4–5.3)
PROT SERPL-MCNC: 7.3 G/DL (ref 6.8–8.8)
PSA SERPL-MCNC: 0.8 UG/L (ref 0–4)
SODIUM SERPL-SCNC: 138 MMOL/L (ref 133–144)
TRIGL SERPL-MCNC: 306 MG/DL

## 2023-05-15 ENCOUNTER — OFFICE VISIT (OUTPATIENT)
Dept: URGENT CARE | Facility: URGENT CARE | Age: 58
End: 2023-05-15
Payer: COMMERCIAL

## 2023-05-15 VITALS
HEART RATE: 69 BPM | BODY MASS INDEX: 35.67 KG/M2 | DIASTOLIC BLOOD PRESSURE: 82 MMHG | RESPIRATION RATE: 12 BRPM | OXYGEN SATURATION: 95 % | TEMPERATURE: 98.2 F | SYSTOLIC BLOOD PRESSURE: 130 MMHG | WEIGHT: 263 LBS

## 2023-05-15 DIAGNOSIS — H61.23 BILATERAL IMPACTED CERUMEN: Primary | ICD-10-CM

## 2023-05-15 PROBLEM — K61.2 ANORECTAL ABSCESS: Status: ACTIVE | Noted: 2021-04-16

## 2023-05-15 PROBLEM — A41.9 SEPSIS (H): Status: ACTIVE | Noted: 2021-04-16

## 2023-05-15 PROBLEM — K61.1 PERIRECTAL ABSCESS: Status: ACTIVE | Noted: 2021-04-15

## 2023-05-15 PROCEDURE — 69209 REMOVE IMPACTED EAR WAX UNI: CPT | Mod: 50 | Performed by: NURSE PRACTITIONER

## 2023-05-15 RX ORDER — TAMSULOSIN HYDROCHLORIDE 0.4 MG/1
1 CAPSULE ORAL DAILY
COMMUNITY
End: 2023-07-22

## 2023-05-15 NOTE — PROGRESS NOTES
Assessment & Plan      Diagnosis Comments   1. Bilateral impacted cerumen  DC REMOVAL IMPACTED CERUMEN IRRIGATION/LVG UNILAT         Patient Instructions   Ear wax build-up: Use over-the-counter Debrox drops - Instill 5 to 10 drops daily up to 4 days then use twice a week as needed.   Avoid use of Q-tips as these tend to lodge the wax in the ear and can cause a hard plug of wax to form in the ear canal.    Follow up as needed.        Earwax Removal    The ear canal makes earwax from the canal s lining. The ears make wax to lubricate and protect the ear canal. The ear canal is the tube that connects the middle ear to the outside of the ear. The wax protects the ear from bacteria, infection, and damage from water or trauma.   The wax that forms in the canal naturally moves toward the outside of the ear and falls out. In some cases, the ear may make too much wax. If the wax causes problems or keeps the healthcare provider from seeing into the ear, the extra wax may be removed.   Too much wax can affect your hearing. It can cause itching. In rare cases, it can be painful. Earwax should not be removed unless it is causing a problem. You should not stick objects such as cotton swabs into your ear to remove wax unless told to do so by your healthcare provider.   Healthcare providers can remove earwax safely. Often flushing the earwax out with water (irrigation) and syringing will help. Sometimes devices or suction are used to remove the wax. It is important to stay still during the procedure to prevent damage to the ear canal. But removing earwax generally doesn t hurt. You won't need anesthesia or pain medicine when the provider removes the earwax.   A number of conditions lead to earwax buildup. These include some skin problems, a narrow ear canal, or ears that make too much earwax. Using cotton swabs in the canal pushes earwax deeper into the ear and helps lead to the buildup of earwax.   Home care    The healthcare  "provider may advise mineral oil or an over-the-counter (OTC) eardrop to use at home to soften the earwax. He or she may also advise a home irrigation or syringing kit. Use these products only if the provider advises them. Carefully follow the instructions given.    Don t use mineral oil or OTC eardrops if you might have an ear infection or a burst (ruptured) eardrum. Tell your provider right away if you have diabetes or an immune disorder.    Don t use cotton swabs in your ears. Cotton swabs may push wax deeper into the ear canal or damage the eardrum. Use cotton gauze or a wet washcloth to gently remove wax on the outside of the ear and around the opening to the ear canal.    Don't use any probing device or object such as cotton-tipped swabs or mike pins to clean the inside of your ears.    Don t use ear candles to clean your ears. Candling can be dangerous. It can burn the ear canal. It can also make the condition worse instead of better.    Don t use cold water to rinse the ear. This will make you dizzy. If your provider tells you to rinse your ear, use only warm water or follow his or her instructions.    Check the ear for signs of infection or irritation (listed below under \"When to get medical advice\").  Steps for using eardrops  1. Warm the medicine bottle by rubbing it between your hands for a few minutes.  2. Lie down on your side, with the affected ear up.  3. Place the advised number of drops in the ear. Wet a cotton ball with the medicine. Gently put the cotton ball into the ear opening.    Follow-up care  Follow up with your healthcare provider, or as advised.   When to get medical advice   Call your healthcare provider right away if you have:     Ear pain that gets worse    Fever of 100.4F F (38 C) or higher, or as directed by your healthcare provider    Worsening wax buildup    Severe pain, dizziness, or nausea    Bleeding from the ear    Hearing problems    Signs of irritation from the eardrops, " such as burning, stinging, or swelling and soreness    Foul-smelling fluid draining from the ear    Signs of infection such as outer ear swelling, redness, or soreness    Headache, neck pain, or stiff neck  [employer] last reviewed this educational content on 6/1/2020 2000-2021 [employer]. All rights reserved. This information is not intended as a substitute for professional medical care. Always follow your healthcare professional's instructions.                  Perlita Gillette, BE[facility] [place]    Marsha Quinteros is a 58 year old male who presents to clinic today for the following health issues:  Chief Complaint   Patient presents with     Ear Problem     Per pt Sx Thursday morning , left  Ear plugged and Painful . Patient tried some debrox and flushing.     HPI    Patient states that he noted approximately 4 days ago with plugged ear feelings mainly on the left side however it is bilateral.  He does use Debrox at home and has tried using a flushing system that has not helped.  Denies sinus congestion headache or cough.      Review of Systems  Constitutional, HEENT, cardiovascular, pulmonary, gi and gu systems are negative, except as otherwise noted.      Objective    /82   Pulse 69   Temp 98.2  F (36.8  C) (Tympanic)   Resp 12   Wt 119.3 kg (263 lb)   SpO2 95%   BMI 35.67 kg/m    Physical Exam   GENERAL: healthy, alert and no distress  HENT: normal cephalic/atraumatic, both ears: occluded with wax both ears were irrigated with reported large amounts of cerumen removed no use of curettage by MA and reevaluated by provider bilateral canals open with intact TMs patient tolerated procedure well, nose and mouth without ulcers or lesions, oropharynx clear and oral mucous membranes moist  NECK: no adenopathy, no asymmetry, masses, or scars and thyroid normal to palpation  RESP: lungs clear to auscultation - no rales, rhonchi or wheezes  CV: regular rate  and rhythm, normal S1 S2, no S3 or S4, no murmur, click or rub, no peripheral edema and peripheral pulses strong  MS: no gross musculoskeletal defects noted, no edema

## 2023-05-16 NOTE — PATIENT INSTRUCTIONS
Ear wax build-up: Use over-the-counter Debrox drops - Instill 5 to 10 drops daily up to 4 days then use twice a week as needed.   Avoid use of Q-tips as these tend to lodge the wax in the ear and can cause a hard plug of wax to form in the ear canal.    Follow up as needed.        Earwax Removal    The ear canal makes earwax from the canal s lining. The ears make wax to lubricate and protect the ear canal. The ear canal is the tube that connects the middle ear to the outside of the ear. The wax protects the ear from bacteria, infection, and damage from water or trauma.   The wax that forms in the canal naturally moves toward the outside of the ear and falls out. In some cases, the ear may make too much wax. If the wax causes problems or keeps the healthcare provider from seeing into the ear, the extra wax may be removed.   Too much wax can affect your hearing. It can cause itching. In rare cases, it can be painful. Earwax should not be removed unless it is causing a problem. You should not stick objects such as cotton swabs into your ear to remove wax unless told to do so by your healthcare provider.   Healthcare providers can remove earwax safely. Often flushing the earwax out with water (irrigation) and syringing will help. Sometimes devices or suction are used to remove the wax. It is important to stay still during the procedure to prevent damage to the ear canal. But removing earwax generally doesn t hurt. You won't need anesthesia or pain medicine when the provider removes the earwax.   A number of conditions lead to earwax buildup. These include some skin problems, a narrow ear canal, or ears that make too much earwax. Using cotton swabs in the canal pushes earwax deeper into the ear and helps lead to the buildup of earwax.   Home care  The healthcare provider may advise mineral oil or an over-the-counter (OTC) eardrop to use at home to soften the earwax. He or she may also advise a home irrigation or  "syringing kit. Use these products only if the provider advises them. Carefully follow the instructions given.  Don t use mineral oil or OTC eardrops if you might have an ear infection or a burst (ruptured) eardrum. Tell your provider right away if you have diabetes or an immune disorder.  Don t use cotton swabs in your ears. Cotton swabs may push wax deeper into the ear canal or damage the eardrum. Use cotton gauze or a wet washcloth to gently remove wax on the outside of the ear and around the opening to the ear canal.  Don't use any probing device or object such as cotton-tipped swabs or mike pins to clean the inside of your ears.  Don t use ear candles to clean your ears. Candling can be dangerous. It can burn the ear canal. It can also make the condition worse instead of better.  Don t use cold water to rinse the ear. This will make you dizzy. If your provider tells you to rinse your ear, use only warm water or follow his or her instructions.  Check the ear for signs of infection or irritation (listed below under \"When to get medical advice\").  Steps for using eardrops  Warm the medicine bottle by rubbing it between your hands for a few minutes.  Lie down on your side, with the affected ear up.  Place the advised number of drops in the ear. Wet a cotton ball with the medicine. Gently put the cotton ball into the ear opening.    Follow-up care  Follow up with your healthcare provider, or as advised.   When to get medical advice   Call your healthcare provider right away if you have:   Ear pain that gets worse  Fever of 100.4F F (38 C) or higher, or as directed by your healthcare provider  Worsening wax buildup  Severe pain, dizziness, or nausea  Bleeding from the ear  Hearing problems  Signs of irritation from the eardrops, such as burning, stinging, or swelling and soreness  Foul-smelling fluid draining from the ear  Signs of infection such as outer ear swelling, redness, or soreness  Headache, neck pain, or " stiff neck  [employer] last reviewed this educational content on 6/1/2020 2000-2021 [employer]. All rights reserved. This information is not intended as a substitute for professional medical care. Always follow your healthcare professional's instructions.

## 2023-07-22 ENCOUNTER — OFFICE VISIT (OUTPATIENT)
Dept: URGENT CARE | Facility: URGENT CARE | Age: 58
End: 2023-07-22
Payer: COMMERCIAL

## 2023-07-22 VITALS
OXYGEN SATURATION: 96 % | BODY MASS INDEX: 34.67 KG/M2 | HEART RATE: 80 BPM | DIASTOLIC BLOOD PRESSURE: 85 MMHG | WEIGHT: 255.6 LBS | TEMPERATURE: 99.2 F | RESPIRATION RATE: 16 BRPM | SYSTOLIC BLOOD PRESSURE: 151 MMHG

## 2023-07-22 DIAGNOSIS — L03.115 CELLULITIS OF RIGHT LOWER EXTREMITY: Primary | ICD-10-CM

## 2023-07-22 PROCEDURE — 99213 OFFICE O/P EST LOW 20 MIN: CPT | Performed by: NURSE PRACTITIONER

## 2023-07-22 RX ORDER — CEPHALEXIN 500 MG/1
500 CAPSULE ORAL 4 TIMES DAILY
Qty: 28 CAPSULE | Refills: 0 | Status: SHIPPED | OUTPATIENT
Start: 2023-07-22 | End: 2023-07-29

## 2023-07-22 ASSESSMENT — PAIN SCALES - GENERAL: PAINLEVEL: NO PAIN (0)

## 2023-07-22 NOTE — PROGRESS NOTES
Assessment & Plan     Cellulitis of right lower extremity    - cephALEXin (KEFLEX) 500 MG capsule  Dispense: 28 capsule; Refill: 0     Prescription sent to pharmacy for Keflex four times daily for 7 days for cellulitis. Keep skin clean and dry, do not pick or poke. Watch closely for worsening symptoms of infection including fever, chills, redness, swelling, pain, purulent discharge and follow-up right away if develops.     Follow-up with PCP if symptoms persist for 5 days, and sooner if symptoms worsen or new symptoms develop.     Discussed red flag symptoms which warrant immediate visit in emergency room    All questions were answered and patient verbalized understanding. AVS reviewed with patient.     Jennifer Duong, DNP, APRN, CNP 7/22/2023 9:49 AM  [facility]        Marsha Quinteros is a 58 year old male who presents to clinic today for the following health issues:  Chief Complaint   Patient presents with     Wounds     1.5 weeks, on the right lower leg, possible sliver that came out, swelling, pain, redness      MS Injury/Pain    Onset of symptoms was 1.5 weeks ago.  Location: right leg  Context: No known injury, thought he was stung by a bee while working with wood and a few days later pulled out a large wood splinter  Course of symptoms is worsening over the past 3 days    Severity moderate  Current and Associated symptoms: Pain, Swelling, Warmth and Redness  Denies  Bruising and Decreased ROM  Aggravating Factors: touching  Therapies to improve symptoms include: advil a few days ago and ice don't seem to help  This is the first time this type of problem has occurred for this patient.   Denies fever, chills, drainage  No history of MRSA    Problem list, Medication list, Allergies, and Medical history reviewed in EPIC.    ROS:  Review of systems negative except for noted above        Objective    BP (!) 151/85   Pulse 80   Temp 99.2  F (37.3  C) (Tympanic)   Resp 16   Wt  115.9 kg (255 lb 9.6 oz)   SpO2 96%   BMI 34.67 kg/m    Physical Exam  Constitutional:       General: He is not in acute distress.     Appearance: He is not toxic-appearing or diaphoretic.   Lymphadenopathy:      Cervical: No cervical adenopathy.   Skin:     General: Skin is warm and dry.      Findings: Erythema present. No bruising.      Comments: Approx 3 mm scab right shin with surrounding erythema with increased warmth, mild swelling, tender with palpation right lower leg. No abscess or drainage   Neurological:      Mental Status: He is alert.      Sensory: No sensory deficit.              Verbal consent obtained from patient to take photo for medical electronic health record

## 2023-08-10 ENCOUNTER — OFFICE VISIT (OUTPATIENT)
Dept: OTOLARYNGOLOGY | Facility: CLINIC | Age: 58
End: 2023-08-10
Attending: PHYSICIAN ASSISTANT
Payer: COMMERCIAL

## 2023-08-10 VITALS
OXYGEN SATURATION: 98 % | RESPIRATION RATE: 18 BRPM | DIASTOLIC BLOOD PRESSURE: 86 MMHG | SYSTOLIC BLOOD PRESSURE: 131 MMHG | HEART RATE: 83 BPM

## 2023-08-10 DIAGNOSIS — K21.9 LPRD (LARYNGOPHARYNGEAL REFLUX DISEASE): ICD-10-CM

## 2023-08-10 DIAGNOSIS — R09.82 PND (POST-NASAL DRIP): ICD-10-CM

## 2023-08-10 DIAGNOSIS — R09.A2 GLOBUS SENSATION: Primary | ICD-10-CM

## 2023-08-10 PROCEDURE — 31575 DIAGNOSTIC LARYNGOSCOPY: CPT | Performed by: OTOLARYNGOLOGY

## 2023-08-10 PROCEDURE — 99243 OFF/OP CNSLTJ NEW/EST LOW 30: CPT | Mod: 25 | Performed by: OTOLARYNGOLOGY

## 2023-08-10 RX ORDER — IPRATROPIUM BROMIDE 42 UG/1
2 SPRAY, METERED NASAL 4 TIMES DAILY PRN
Qty: 15 ML | Refills: 11 | Status: SHIPPED | OUTPATIENT
Start: 2023-08-10

## 2023-08-10 NOTE — LETTER
8/10/2023         RE: Aldair Mckeon  [address] [facility] [address]        Dear Colleague,    Thank you for referring your patient, Aldair Mckeon, to the [facility]. Please see a copy of my visit note below.    I am seeing this patient in consultation for nasal congestion at the request of the provider Doni Albarran.    Chief Complaint - Nasal drainage    History of Present Illness - Aldair Mckeon is a 58 year old male who presents for evaluation of nasal obstruction. The patient describes symptoms of postnasal drainage at night, cough, also worse at night for years. Mostly clear. Sometimes yellow in the morning. It's thicker. No troubles breathing. He has poor smell (for 15 years). He had deviated septum surgery, and has poor smell since. He coughs and clears throat a lot. He has reflux. He takes prilosec every other day. Has had sinus infections, but not regularly. He can breathe okay in nose. He has had to throat up before. The patient notes allergies. He has been tested for allergies in the past. Treatments have included Navage (helps a little). Hasn't tried allergy medication or nasal sprays.     Tests personally reviewed today for this visit:   1.) CMP on 4/13/23 glucose 115, calcium 8.1, chloride 111  2.) covid negative 7/2022    Past Medical History -   Patient Active Problem List   Diagnosis     External hemorrhoids     BIPIN (obstructive sleep apnea)     Morbid obesity (H)     Anorectal abscess     Sepsis (H)     Perirectal abscess       Current Medications -   Current Outpatient Medications:      aspirin 81 MG tablet, Take 81 mg by mouth daily, Disp: , Rfl:      ibuprofen (ADVIL/MOTRIN) 200 MG tablet, Take 200 mg by mouth every 4 hours as needed for mild pain, Disp: , Rfl:     Allergies - No Known Allergies    Social History -   Social History     Socioeconomic History     Marital status:    Tobacco Use     Smoking status: Never     Smokeless tobacco: Never   Vaping  Use     Vaping Use: Never used   Substance and Sexual Activity     Alcohol use: Not Currently     Drug use: Never       Family History -   Family History   Problem Relation Age of Onset     Diabetes Mother      Prostate Cancer Father 70       Review of Systems - As per HPI and PMHx, otherwise 7 system review of the head and neck is negative.    Physical Exam  General - The patient is in no distress. Alert, answers questions and cooperates with examination appropriately.   Neurologic - CN II-XII are grossly intact. No focal neurologic deficits.   Voice and Breathing - The patient was breathing comfortably without the use of accessory muscles. There was no wheezing, stridor, or stertor.  The patients voice was clear and strong.  Eyes - Extraocular movements intact. Sclera were not icteric or injected, conjunctiva were pink and moist.  Mouth - Examination of the oral cavity showed pink, healthy oral mucosa. No lesions or ulcerations noted.  The tongue was mobile and midline, and the dentition were in good condition.    Throat - The walls of the oropharynx were smooth, pink, moist, symmetric, and had no lesions or ulcerations.  The tonsillar pillars and soft palate were symmetric.  The uvula was midline on elevation. Tonsils 2+.  Neck -  Soft, non-tender. Palpation of the occipital, submental, submandibular, internal jugular chain, and supraclavicular nodes did not demonstrate any abnormal lymph nodes or masses. No parotid masses.   Nose - External contour is symmetric, no gross deflection or scars.  Subcentimeter anterior septal perforation. The turbinates are normal. The septum was midline. No polyps, masses, or purulence noted on examination.    Flexible Endoscopy -    Attempts at mirror laryngoscopy could not be performed due to gag reflex and patient anatomy. Given the chief complaint, history, and physical examination, and to best visualize the airway anatomy, I proceeded with a fiberoptic examination. Color  photographs were taken for the permanent medical record. First I sprayed the right side of the nose with a mixture of lidocaine and neosynephrine.  I then passed the scope through the nasal cavity.  Septum had a small perforation. No pus.  The nasopharynx was mucosally covered and symmetric.  The Eustachian tube openings were unobstructed.  Mild postnasal drainage, clear. Going further down I had a clear view of the base of tongue, which had normal appearing lingual tonsillar tissue.  The base of tongue was free of lesions, masses, and the vallecula was open.  The epiglottis was smooth and mucosally covered.  The supraglottic larynx was then clearly visualized.  I did note some amount of edema in the interarytenoid soft tissues and posterior surface of the larynx. The vocal cords moved smoothly and symmetrically, they were pearly white and no lesions were seen. No excess mucous even though he kept clearing throat. The pyriform sinuses were open, and the limited view of the postcricoid region did not show any erosive or mass lesions.                              A/P - Aldair Mckeon is a 58 year old male with globus sensation causing constant throat-clearing. This maybe due to silent reflux, postnasal drainage, habitual. I recommend nexium 20 mg daily (he was taking prilosec 20 mg every other day). I recommend atrovent and navage for postnasal drainage. Stop throat-clearing so much. Return in 2 month.     Jose Manuel Pierre MD  Otolaryngology  [facility]      Again, thank you for allowing me to participate in the care of your patient.        Sincerely,        Jose Manuel Pierre MD

## 2023-08-10 NOTE — PROGRESS NOTES
I am seeing this patient in consultation for nasal congestion at the request of the provider Doni Albarran.    Chief Complaint - Nasal drainage    History of Present Illness - Aldair Mckeon is a 58 year old male who presents for evaluation of nasal obstruction. The patient describes symptoms of postnasal drainage at night, cough, also worse at night for years. Mostly clear. Sometimes yellow in the morning. It's thicker. No troubles breathing. He has poor smell (for 15 years). He had deviated septum surgery, and has poor smell since. He coughs and clears throat a lot. He has reflux. He takes prilosec every other day. Has had sinus infections, but not regularly. He can breathe okay in nose. He has had to throat up before. The patient notes allergies. He has been tested for allergies in the past. Treatments have included Navage (helps a little). Hasn't tried allergy medication or nasal sprays.     Tests personally reviewed today for this visit:   1.) CMP on 4/13/23 glucose 115, calcium 8.1, chloride 111  2.) covid negative 7/2022    Past Medical History -   Patient Active Problem List   Diagnosis    External hemorrhoids    BIPIN (obstructive sleep apnea)    Morbid obesity (H)    Anorectal abscess    Sepsis (H)    Perirectal abscess       Current Medications -   Current Outpatient Medications:     aspirin 81 MG tablet, Take 81 mg by mouth daily, Disp: , Rfl:     ibuprofen (ADVIL/MOTRIN) 200 MG tablet, Take 200 mg by mouth every 4 hours as needed for mild pain, Disp: , Rfl:     Allergies - No Known Allergies    Social History -   Social History     Socioeconomic History    Marital status:    Tobacco Use    Smoking status: Never    Smokeless tobacco: Never   Vaping Use    Vaping Use: Never used   Substance and Sexual Activity    Alcohol use: Not Currently    Drug use: Never       Family History -   Family History   Problem Relation Age of Onset    Diabetes Mother     Prostate Cancer Father 70       Review of Systems  - As per HPI and PMHx, otherwise 7 system review of the head and neck is negative.    Physical Exam  General - The patient is in no distress. Alert, answers questions and cooperates with examination appropriately.   Neurologic - CN II-XII are grossly intact. No focal neurologic deficits.   Voice and Breathing - The patient was breathing comfortably without the use of accessory muscles. There was no wheezing, stridor, or stertor.  The patients voice was clear and strong.  Eyes - Extraocular movements intact. Sclera were not icteric or injected, conjunctiva were pink and moist.  Mouth - Examination of the oral cavity showed pink, healthy oral mucosa. No lesions or ulcerations noted.  The tongue was mobile and midline, and the dentition were in good condition.    Throat - The walls of the oropharynx were smooth, pink, moist, symmetric, and had no lesions or ulcerations.  The tonsillar pillars and soft palate were symmetric.  The uvula was midline on elevation. Tonsils 2+.  Neck -  Soft, non-tender. Palpation of the occipital, submental, submandibular, internal jugular chain, and supraclavicular nodes did not demonstrate any abnormal lymph nodes or masses. No parotid masses.   Nose - External contour is symmetric, no gross deflection or scars.  Subcentimeter anterior septal perforation. The turbinates are normal. The septum was midline. No polyps, masses, or purulence noted on examination.    Flexible Endoscopy -    Attempts at mirror laryngoscopy could not be performed due to gag reflex and patient anatomy. Given the chief complaint, history, and physical examination, and to best visualize the airway anatomy, I proceeded with a fiberoptic examination. Color photographs were taken for the permanent medical record. First I sprayed the right side of the nose with a mixture of lidocaine and neosynephrine.  I then passed the scope through the nasal cavity.  Septum had a small perforation. No pus.  The nasopharynx was  mucosally covered and symmetric.  The Eustachian tube openings were unobstructed.  Mild postnasal drainage, clear. Going further down I had a clear view of the base of tongue, which had normal appearing lingual tonsillar tissue.  The base of tongue was free of lesions, masses, and the vallecula was open.  The epiglottis was smooth and mucosally covered.  The supraglottic larynx was then clearly visualized.  I did note some amount of edema in the interarytenoid soft tissues and posterior surface of the larynx. The vocal cords moved smoothly and symmetrically, they were pearly white and no lesions were seen. No excess mucous even though he kept clearing throat. The pyriform sinuses were open, and the limited view of the postcricoid region did not show any erosive or mass lesions.                              A/P - Aldair Mckeon is a 58 year old male with globus sensation causing constant throat-clearing. This maybe due to silent reflux, postnasal drainage, habitual. I recommend nexium 20 mg daily (he was taking prilosec 20 mg every other day). I recommend atrovent and navage for postnasal drainage. Stop throat-clearing so much. Return in 2 month.     Jose Manuel Pierre MD  Otolaryngology  [facility]

## 2023-10-12 ENCOUNTER — OFFICE VISIT (OUTPATIENT)
Dept: OTOLARYNGOLOGY | Facility: CLINIC | Age: 58
End: 2023-10-12
Payer: COMMERCIAL

## 2023-10-12 VITALS
SYSTOLIC BLOOD PRESSURE: 148 MMHG | HEART RATE: 78 BPM | OXYGEN SATURATION: 97 % | RESPIRATION RATE: 18 BRPM | DIASTOLIC BLOOD PRESSURE: 90 MMHG

## 2023-10-12 DIAGNOSIS — R05.3 CHRONIC COUGH: Primary | ICD-10-CM

## 2023-10-12 PROCEDURE — 99213 OFFICE O/P EST LOW 20 MIN: CPT | Performed by: OTOLARYNGOLOGY

## 2023-10-12 RX ORDER — PREDNISONE 20 MG/1
TABLET ORAL
Qty: 20 TABLET | Refills: 0 | Status: SHIPPED | OUTPATIENT
Start: 2023-10-12 | End: 2024-01-02

## 2023-10-12 RX ORDER — MONTELUKAST SODIUM 10 MG/1
10 TABLET ORAL AT BEDTIME
Qty: 30 TABLET | Refills: 4 | Status: SHIPPED | OUTPATIENT
Start: 2023-10-12 | End: 2024-01-02

## 2023-10-12 NOTE — PROGRESS NOTES
Chief Complaint - Nasal drainage, cough    History of Present Illness - Aldair Mckeon is a 58 year old male who returns for evaluation of nasal drainage. The patient describes symptoms of postnasal drainage at night, cough, also worse at night for years. Mostly clear. Sometimes yellow in the morning. It's thicker. No troubles breathing. He has poor smell (for 15 years). He had deviated septum surgery, and has poor smell since. He coughs and clears throat a lot. He has reflux. He takes prilosec every other day. Has had sinus infections, but not regularly. He can breathe okay in nose. He has had to throat up before. The patient notes allergies. He has been tested for allergies in the past. Treatments have included Navage (helps a little). Hasn't tried allergy medication or nasal sprays. I recommended nexium 20 mg daily (he was taking prilosec 20 mg every other day). I recommended atrovent and navage for postnasal drainage. He returns and notes he had a little improvement. He has had less drastic coughing. He is still coughing, but it isn't as bad. He still has a tickle in the throat. Cold air bothers him more. He feels cough comes more from the chest. Cough is mostly nonproductive. No wheezing. Breathing has been okay.     Tests personally reviewed today for this visit:   1.) CMP on 4/13/23 glucose 115, calcium 8.1, chloride 111  2.) covid negative 7/2022  3.) CT chest 7/2022 showed patchy groundglass infiltrates as well as some mixed consolidation/atelectasis in the right lung base suspicious for pneumonitis.    Past Medical History -   Patient Active Problem List   Diagnosis    External hemorrhoids    BIPIN (obstructive sleep apnea)    Morbid obesity (H)    Anorectal abscess    Sepsis (H)    Perirectal abscess       Current Medications -   Current Outpatient Medications:     aspirin 81 MG tablet, Take 81 mg by mouth daily, Disp: , Rfl:     ibuprofen (ADVIL/MOTRIN) 200 MG tablet, Take 200 mg by mouth every 4 hours as  needed for mild pain, Disp: , Rfl:     ipratropium (ATROVENT) 0.06 % nasal spray, Spray 2 sprays into both nostrils 4 times daily as needed for rhinitis (nasal drainage), Disp: 15 mL, Rfl: 11    Allergies - No Known Allergies    Social History -   Social History     Socioeconomic History    Marital status:    Tobacco Use    Smoking status: Never    Smokeless tobacco: Never   Vaping Use    Vaping Use: Never used   Substance and Sexual Activity    Alcohol use: Not Currently    Drug use: Never       Family History -   Family History   Problem Relation Age of Onset    Diabetes Mother     Prostate Cancer Father 70       Review of Systems - As per HPI and PMHx, otherwise 7 system review of the head and neck is negative.    Physical Exam  General - The patient is in no distress. Alert, answers questions and cooperates with examination appropriately.   Neurologic - CN II-XII are grossly intact. No focal neurologic deficits.   Voice and Breathing - The patient was breathing comfortably without the use of accessory muscles. There was no wheezing, stridor, or stertor.  The patients voice was clear and strong.  Eyes - Extraocular movements intact. Sclera were not icteric or injected, conjunctiva were pink and moist.  Mouth - Examination of the oral cavity showed pink, healthy oral mucosa. No lesions or ulcerations noted.  The tongue was mobile and midline, and the dentition were in good condition.    Throat - The walls of the oropharynx were smooth, pink, moist, symmetric, and had no lesions or ulcerations.  The tonsillar pillars and soft palate were symmetric.  The uvula was midline on elevation. Tonsils 2+.  Neck -  Soft, non-tender. Palpation of the occipital, submental, submandibular, internal jugular chain, and supraclavicular nodes did not demonstrate any abnormal lymph nodes or masses. No parotid masses.   Nose - External contour is symmetric, no gross deflection or scars.  Subcentimeter anterior septal  perforation. Some crusting. The turbinates are normal. The septum was midline. No polyps, masses, or purulence noted on examination.    Flexible Endoscopy - (performed last visit 8/10/23)    Given the chief complaint, history, and physical examination, and to best visualize the airway anatomy, I proceeded with a fiberoptic examination. Color photographs were taken for the permanent medical record. First I sprayed the right side of the nose with a mixture of lidocaine and neosynephrine.  I then passed the scope through the nasal cavity.  Septum had a small perforation. No pus.  The nasopharynx was mucosally covered and symmetric.  The Eustachian tube openings were unobstructed.  Mild postnasal drainage, clear. Going further down I had a clear view of the base of tongue, which had normal appearing lingual tonsillar tissue.  The base of tongue was free of lesions, masses, and the vallecula was open.  The epiglottis was smooth and mucosally covered.  The supraglottic larynx was then clearly visualized.  I did note some amount of edema in the interarytenoid soft tissues and posterior surface of the larynx. The vocal cords moved smoothly and symmetrically, they were pearly white and no lesions were seen. No excess mucous even though he kept clearing throat. The pyriform sinuses were open, and the limited view of the postcricoid region did not show any erosive or mass lesions.                              A/P -     ICD-10-CM    1. Chronic cough  R05.3 Adult Pulmonary Medicine  Referral     predniSONE (DELTASONE) 20 MG tablet     montelukast (SINGULAIR) 10 MG tablet      2.) Globus sensation    Aldair Mckeon is a 58 year old male with globus sensation causing constant throat-clearing. This maybe due to silent reflux, postnasal drainage, habitual. However, he has a cough that he feels comes from the chest. This maybe 2 different problems. He didn't get dramatically better with nexium 20 mg daily (he was taking  prilosec 20 mg every other day), or atrovent and navage for postnasal drainage and globus. Due to his cough that he feels comes from the chest and pneumonitis on CT chest, I recommend a pulmonary referral and eval. In the meantime, I will treat him with steroids to see if the cough gets better suggesting a pulmonary inflammatory etiology, and singulair after that. He will mychart me his progress.     Jose Manuel Pierre MD  Otolaryngology  [facility]

## 2023-10-12 NOTE — LETTER
10/12/2023         RE: Aldair Mckeon  [address] [facility] [address]        Dear Colleague,    Thank you for referring your patient, Aldair Mckeon, to the [facility]. Please see a copy of my visit note below.    Chief Complaint - Nasal drainage, cough    History of Present Illness - Aldair Mckeon is a 58 year old male who returns for evaluation of nasal drainage. The patient describes symptoms of postnasal drainage at night, cough, also worse at night for years. Mostly clear. Sometimes yellow in the morning. It's thicker. No troubles breathing. He has poor smell (for 15 years). He had deviated septum surgery, and has poor smell since. He coughs and clears throat a lot. He has reflux. He takes prilosec every other day. Has had sinus infections, but not regularly. He can breathe okay in nose. He has had to throat up before. The patient notes allergies. He has been tested for allergies in the past. Treatments have included Navage (helps a little). Hasn't tried allergy medication or nasal sprays. I recommended nexium 20 mg daily (he was taking prilosec 20 mg every other day). I recommended atrovent and navage for postnasal drainage. He returns and notes he had a little improvement. He has had less drastic coughing. He is still coughing, but it isn't as bad. He still has a tickle in the throat. Cold air bothers him more. He feels cough comes more from the chest. Cough is mostly nonproductive. No wheezing. Breathing has been okay.     Tests personally reviewed today for this visit:   1.) CMP on 4/13/23 glucose 115, calcium 8.1, chloride 111  2.) covid negative 7/2022  3.) CT chest 7/2022 showed patchy groundglass infiltrates as well as some mixed consolidation/atelectasis in the right lung base suspicious for pneumonitis.    Past Medical History -   Patient Active Problem List   Diagnosis     External hemorrhoids     BIPIN (obstructive sleep apnea)     Morbid obesity (H)     Anorectal  abscess     Sepsis (H)     Perirectal abscess       Current Medications -   Current Outpatient Medications:      aspirin 81 MG tablet, Take 81 mg by mouth daily, Disp: , Rfl:      ibuprofen (ADVIL/MOTRIN) 200 MG tablet, Take 200 mg by mouth every 4 hours as needed for mild pain, Disp: , Rfl:      ipratropium (ATROVENT) 0.06 % nasal spray, Spray 2 sprays into both nostrils 4 times daily as needed for rhinitis (nasal drainage), Disp: 15 mL, Rfl: 11    Allergies - No Known Allergies    Social History -   Social History     Socioeconomic History     Marital status:    Tobacco Use     Smoking status: Never     Smokeless tobacco: Never   Vaping Use     Vaping Use: Never used   Substance and Sexual Activity     Alcohol use: Not Currently     Drug use: Never       Family History -   Family History   Problem Relation Age of Onset     Diabetes Mother      Prostate Cancer Father 70       Review of Systems - As per HPI and PMHx, otherwise 7 system review of the head and neck is negative.    Physical Exam  General - The patient is in no distress. Alert, answers questions and cooperates with examination appropriately.   Neurologic - CN II-XII are grossly intact. No focal neurologic deficits.   Voice and Breathing - The patient was breathing comfortably without the use of accessory muscles. There was no wheezing, stridor, or stertor.  The patients voice was clear and strong.  Eyes - Extraocular movements intact. Sclera were not icteric or injected, conjunctiva were pink and moist.  Mouth - Examination of the oral cavity showed pink, healthy oral mucosa. No lesions or ulcerations noted.  The tongue was mobile and midline, and the dentition were in good condition.    Throat - The walls of the oropharynx were smooth, pink, moist, symmetric, and had no lesions or ulcerations.  The tonsillar pillars and soft palate were symmetric.  The uvula was midline on elevation. Tonsils 2+.  Neck -  Soft, non-tender. Palpation of the  occipital, submental, submandibular, internal jugular chain, and supraclavicular nodes did not demonstrate any abnormal lymph nodes or masses. No parotid masses.   Nose - External contour is symmetric, no gross deflection or scars.  Subcentimeter anterior septal perforation. Some crusting. The turbinates are normal. The septum was midline. No polyps, masses, or purulence noted on examination.    Flexible Endoscopy - (performed last visit 8/10/23)    Given the chief complaint, history, and physical examination, and to best visualize the airway anatomy, I proceeded with a fiberoptic examination. Color photographs were taken for the permanent medical record. First I sprayed the right side of the nose with a mixture of lidocaine and neosynephrine.  I then passed the scope through the nasal cavity.  Septum had a small perforation. No pus.  The nasopharynx was mucosally covered and symmetric.  The Eustachian tube openings were unobstructed.  Mild postnasal drainage, clear. Going further down I had a clear view of the base of tongue, which had normal appearing lingual tonsillar tissue.  The base of tongue was free of lesions, masses, and the vallecula was open.  The epiglottis was smooth and mucosally covered.  The supraglottic larynx was then clearly visualized.  I did note some amount of edema in the interarytenoid soft tissues and posterior surface of the larynx. The vocal cords moved smoothly and symmetrically, they were pearly white and no lesions were seen. No excess mucous even though he kept clearing throat. The pyriform sinuses were open, and the limited view of the postcricoid region did not show any erosive or mass lesions.                              A/P -     ICD-10-CM    1. Chronic cough  R05.3 Adult Pulmonary Medicine  Referral     predniSONE (DELTASONE) 20 MG tablet     montelukast (SINGULAIR) 10 MG tablet      2.) Globus sensation    Aldair Mckeon is a 58 year old male with globus sensation  causing constant throat-clearing. This maybe due to silent reflux, postnasal drainage, habitual. However, he has a cough that he feels comes from the chest. This maybe 2 different problems. He didn't get dramatically better with nexium 20 mg daily (he was taking prilosec 20 mg every other day), or atrovent and navage for postnasal drainage and globus. Due to his cough that he feels comes from the chest and pneumonitis on CT chest, I recommend a pulmonary referral and eval. In the meantime, I will treat him with steroids to see if the cough gets better suggesting a pulmonary inflammatory etiology, and singulair after that. He will mychart me his progress.     Jose Manuel Pierre MD  Otolaryngology  [facility]      Again, thank you for allowing me to participate in the care of your patient.        Sincerely,        Jose Manuel Pierre MD

## 2023-10-13 DIAGNOSIS — R05.3 CHRONIC COUGH: Primary | ICD-10-CM

## 2023-12-21 ENCOUNTER — TELEPHONE (OUTPATIENT)
Dept: PULMONOLOGY | Facility: CLINIC | Age: 58
End: 2023-12-21
Payer: COMMERCIAL

## 2023-12-21 NOTE — TELEPHONE ENCOUNTER
Contacted [facility] radiology and requested Ct chest PE study from 07/14/2022 be pushed to  PACS.    Regino Bryan LPN  Pulmonary Medicine:  [facility]  Phone: [phone] Fax: [phone]

## 2023-12-28 ENCOUNTER — OFFICE VISIT (OUTPATIENT)
Dept: NURSING | Facility: CLINIC | Age: 58
End: 2023-12-28
Attending: OTOLARYNGOLOGY
Payer: COMMERCIAL

## 2023-12-28 ENCOUNTER — ANCILLARY PROCEDURE (OUTPATIENT)
Dept: GENERAL RADIOLOGY | Facility: CLINIC | Age: 58
End: 2023-12-28
Payer: COMMERCIAL

## 2023-12-28 VITALS — BODY MASS INDEX: 36.86 KG/M2 | OXYGEN SATURATION: 96 % | HEART RATE: 77 BPM | WEIGHT: 271.8 LBS

## 2023-12-28 DIAGNOSIS — R05.3 CHRONIC COUGH: ICD-10-CM

## 2023-12-28 PROCEDURE — 94375 RESPIRATORY FLOW VOLUME LOOP: CPT | Performed by: INTERNAL MEDICINE

## 2023-12-28 PROCEDURE — 71046 X-RAY EXAM CHEST 2 VIEWS: CPT | Mod: GC | Performed by: RADIOLOGY

## 2023-12-28 PROCEDURE — 94729 DIFFUSING CAPACITY: CPT | Performed by: INTERNAL MEDICINE

## 2023-12-28 PROCEDURE — 94726 PLETHYSMOGRAPHY LUNG VOLUMES: CPT | Performed by: INTERNAL MEDICINE

## 2023-12-30 LAB
DLCOUNC-%PRED-PRE: 103 %
DLCOUNC-PRE: 30.46 ML/MIN/MMHG
DLCOUNC-PRED: 29.45 ML/MIN/MMHG
ERV-%PRED-PRE: 50 %
ERV-PRE: 0.85 L
ERV-PRED: 1.7 L
EXPTIME-PRE: 7.17 SEC
FEF2575-%PRED-PRE: 105 %
FEF2575-PRE: 3.23 L/SEC
FEF2575-PRED: 3.05 L/SEC
FEFMAX-%PRED-PRE: 77 %
FEFMAX-PRE: 7.6 L/SEC
FEFMAX-PRED: 9.77 L/SEC
FEV1-%PRED-PRE: 93 %
FEV1-PRE: 3.38 L
FEV1FEV6-PRE: 80 %
FEV1FEV6-PRED: 79 %
FEV1FVC-PRE: 80 %
FEV1FVC-PRED: 78 %
FEV1SVC-PRE: 77 %
FEV1SVC-PRED: 76 %
FIFMAX-PRE: 4.03 L/SEC
FRCPLETH-%PRED-PRE: 102 %
FRCPLETH-PRE: 3.81 L
FRCPLETH-PRED: 3.72 L
FVC-%PRED-PRE: 91 %
FVC-PRE: 4.23 L
FVC-PRED: 4.62 L
IC-%PRED-PRE: 104 %
IC-PRE: 3.56 L
IC-PRED: 3.4 L
RVPLETH-%PRED-PRE: 120 %
RVPLETH-PRE: 2.95 L
RVPLETH-PRED: 2.46 L
TLCPLETH-%PRED-PRE: 97 %
TLCPLETH-PRE: 7.37 L
TLCPLETH-PRED: 7.53 L
VA-%PRED-PRE: 90 %
VA-PRE: 6.37 L
VC-%PRED-PRE: 93 %
VC-PRE: 4.41 L
VC-PRED: 4.73 L

## 2024-01-02 ENCOUNTER — OFFICE VISIT (OUTPATIENT)
Dept: PULMONOLOGY | Facility: CLINIC | Age: 59
End: 2024-01-02
Attending: OTOLARYNGOLOGY
Payer: COMMERCIAL

## 2024-01-02 VITALS
OXYGEN SATURATION: 93 % | HEART RATE: 77 BPM | RESPIRATION RATE: 18 BRPM | BODY MASS INDEX: 36.61 KG/M2 | WEIGHT: 269.9 LBS | DIASTOLIC BLOOD PRESSURE: 94 MMHG | SYSTOLIC BLOOD PRESSURE: 152 MMHG

## 2024-01-02 DIAGNOSIS — R05.3 CHRONIC COUGH: Primary | ICD-10-CM

## 2024-01-02 PROCEDURE — 99205 OFFICE O/P NEW HI 60 MIN: CPT | Performed by: INTERNAL MEDICINE

## 2024-01-02 RX ORDER — FLUTICASONE PROPIONATE 50 MCG
1 SPRAY, SUSPENSION (ML) NASAL 2 TIMES DAILY
COMMUNITY
Start: 2024-01-02

## 2024-01-02 RX ORDER — ALBUTEROL SULFATE 90 UG/1
2 AEROSOL, METERED RESPIRATORY (INHALATION) EVERY 4 HOURS PRN
Qty: 18 G | Refills: 0 | Status: SHIPPED | OUTPATIENT
Start: 2024-01-02

## 2024-01-02 NOTE — NURSING NOTE
Aldair Mckeon's goals for this visit include:   Chief Complaint   Patient presents with    Cough     Cough consult       He requests these members of his care team be copied on today's visit information: yes    PCP: Doni Albarran    Referring Provider:  Jose Manuel Pierre MD  [id number] [facility]  [place] [address]    BP (!) 152/94 (BP Location: Left arm, Patient Position: Sitting, Cuff Size: Adult Large)   Pulse 77   Resp 18   Wt 122.4 kg (269 lb 14.4 oz)   SpO2 93%   BMI 36.61 kg/m      Do you need any medication refills at today's visit? None    Junior Estrada, EMT

## 2024-01-02 NOTE — PROGRESS NOTES
New Pulmonary Patient Clinic Note   01/02/2024      PCP: Doni Albarran    Reason for visit: Chronic cough    Pulmonary HPI:   Aldair Mckeon is a 58 year old male w/ h/o chronic nasal rhinitis, GERD, BIPIN who presents for evaluation of chronic cough.  Pt reports that he first developed his chronic cough and frequent throat clearing since the early 1990's.    Had previously been told he had old nodules (calcified nodules) related to fungal exposures in [place] and [place] and has had rare PNAs (once in 1980's, once in July 2022).   Otherwise fairly healthy. No wheezing, shortness of breath, chest congestion, chest pain.   He also reports chronic but intermittent nasal congestion and drainage with symptoms of post-nasal drainage.   He state his cough is productive- clear but thick. He feels that there is something thick irritating his throat that is prompting him to cough; when it is expectorated his coughing ceases.  His coughing is not every day.  He will have a mix of coughing fits and 1 or 2 coughs during these days.  He notices that his nose will run typically throughout the day sometimes of being better than others.  At night, will have a coughing spell just before getting into bed.  He was not bindu to fully endorse the correlation of his nasal congestion with his cough, but provided examples of times of when his nasal congestion and nasal drip directly contributed to his cough (shoveling snow outside).  Once he is sleeping and has a CPAP on, he does not wake up due to respiratory symptoms.  Does not feel coughing is instigated within his chest. Feels related to sputum/irritation being stuck in his throat and imrpvoes when he coughs sputum up. Denies blood in sputum.  Over the summer- had acute respiratory symptoms with acute SOB symptoms- went into the ED 7/2022- CT Chest showed patchy ground glass opacities infiltrates and areas of mixed consolidation/atelectasis in the right lung base; also noted for  old granulomatous disease. Symptoms improved with antibiotics.     Was seen by ENT in August and October- was treated for silent reflux (has reflux symptoms) and post nasal drainage via Atrovent nasal spray. Reflux symptoms improved but because his coughing was only intermittent and continued to develop nasal rhinitis and drainage, treating it with Atrovent after the fact, not sure if it helped. Was also given Singular and prednisone- again not sure if it decreased his symptoms due to the intermittent nature of cough and nasal drainage. ENT though the throat clearing may have been habitual. On laryngoscopy, mild nasal drainage seen, some edema of the interarytenoid soft tissues and posterior surface of the larynx.   Has only used albuterol inhalers and steroid inhalers when he had acute URI symptoms in the past.     Change in symptoms with prolonged change in location: No   Any hospitalizations or ED/urgent care visits in last year: No  Previous treatments:  Issues with   Candles: No  Chemicals: No  Fragrances: No  Fumes: No  Changes in season: No  Cold temperature: No   [place] environment? No   Water damage/Mold seen to home? No   Pets? Dog   Pests? No   Hx of nasal polyps? No   Hx of sensitivity no NSAIDs/ASA? No     Review of systems: a complete 12-point ROS conducted, & found to be negative w/ exceptions as noted in the HPI.    Past medical history:  Past Medical History:   Diagnosis Date    Sleep apnea        Past Surgical History:   Procedure Laterality Date    AS DRAINAGE RECTAL ABSCESS      COLONOSCOPY WITH CO2 INSUFFLATION N/A 5/27/2021    Procedure: COLONOSCOPY, WITH CO2 INSUFFLATION;  Surgeon: Parag Haider DO;  Location: [facility] OR    deviated septum      FINGER SURGERY      KNEE SURGERY      ORTHOPEDIC SURGERY      Right Knee ACL       Social history:  Social History     Tobacco Use    Smoking status: Never    Smokeless tobacco: Never   Vaping Use    Vaping Use: Never used   Substance Use  Topics    Alcohol use: Not Currently    Drug use: Never       Family history:  Family History   Problem Relation Age of Onset    Diabetes Mother     Prostate Cancer Father 70       Medications:  Current Outpatient Medications   Medication    albuterol (PROAIR HFA/PROVENTIL HFA/VENTOLIN HFA) 108 (90 Base) MCG/ACT inhaler    fluticasone (FLONASE) 50 MCG/ACT nasal spray    aspirin 81 MG tablet    ipratropium (ATROVENT) 0.06 % nasal spray     No current facility-administered medications for this visit.       Allergies:  No Known Allergies    Physical examination:  BP (!) 152/94 (BP Location: Left arm, Patient Position: Sitting, Cuff Size: Adult Large)   Pulse 77   Resp 18   Wt 122.4 kg (269 lb 14.4 oz)   SpO2 93%   BMI 36.61 kg/m      General: NAD  Eyes: Anicteric sclera  Mouth: MMM w/o lesions  Neck: No masses, no thyromegaly, no stridor  Lymphatics: No significant cervical or supraclavicular LNs  CV: RR, no m/c/r   Lungs: CTAB  Abd: Soft, NT, ND  Ext: WWP, no BLE edema. No clubbing  Skin: No rashes  Neuro: No focal deficits  Psych: Euthymic, normal affect, good eye contact    Labs: reviewed in [facility] & personally interpreted.     Previous CBCs with diff in July 2022- wnl abs eosinophilic count    Imaging: reviewed in [facility] & personally interpreted. Below are the interpretations from the formal Radiology review.  CXR 12/28/2023- Impression: Small bilateral nodular calcifications likely represent  sequela of old granulomatous disease. No acute cardiopulmonary  findings.    PFT:  Most Recent [facility] Pulmonary Function Testing (FVC/FEV1 only)  FVC-Pre   Date Value Ref Range Status   12/28/2023 4.23 L      FVC-%Pred-Pre   Date Value Ref Range Status   12/28/2023 91 %      FEV1-Pre   Date Value Ref Range Status   12/28/2023 3.38 L      FEV1-%Pred-Pre   Date Value Ref Range Status   12/28/2023 93 %          Impression & recommendations:    Aldair was seen today for cough.    Diagnoses and all orders for this  visit:    Chronic cough  -     albuterol (PROAIR HFA/PROVENTIL HFA/VENTOLIN HFA) 108 (90 Base) MCG/ACT inhaler; Inhale 2 puffs into the lungs every 4 hours as needed for shortness of breath, wheezing or cough  -     fluticasone (FLONASE) 50 MCG/ACT nasal spray; Spray 1 spray into both nostrils 2 times daily  -     Adult Pulmonology Clinic Follow-Up Order (6 Month); Future    Patient referred for chronic cough.  Has had a long-term chronic cough and nasal congestion with postnasal drip as well as development of habitual throat clearing.   Feels sensation of something being stuck in his throat that prompts him to cough; when he coughs up the sputum feels much better. He did this in the clinic as well- clear thick sputum came up.   Based off his symptoms and history, does not have the expected features of cough variant asthma.  No history of reflux, he is able to lie flat and sleep through the night without waking up due to cough though when he first lays flat or is about to lay flat with nasal congestion, he does start coughing.  Due to the intermittent nature of the nasal congestion and coughing fits, it is unclear whether the treatment is aimed at nasal congestion and postnasal drip were benefit.    Was only tried on Atrovent which he used intermittently/as needed for topical treatments.  He was also given Singulair and prednisone burst though again unclear if the short lived treatment was beneficial.  His PFTs were normal today.    His CT scan in July 2022 represented an acute inflammatory issues such as pneumonia which resolved symptomatically after treatment with antibiotics; chest x-ray today just showed old granulomatous disease.  Will try to treat consistently with nasal spray such as Flonase to see if persistent treatment of his nasal congestion will improve his postnasal drip and cough.  Also asked him to use the Atrovent nasal spray for any breakthrough symptoms on top of his Flonase (as needed).  If he  continues to have the throat clearing, he may benefit then from speech therapy.  Also trial him on albuterol inhaler for symptoms to see if his cough improves.  Also advised him to continue nasal rinses as well as gargling.  If he continues treatment for nasal congestion and improves, but his cough persists/has no change despite albuterol use, can consider chronic cough medication such as gabapentin but would still try some other methods first.  However, I do not think that this would be the best first choice considering that he has sputum production that clearly triggers his cough.     RTC in 6 months       60 minutes excluding the time spent on cigarette cessation was  spent on the date of the encounter doing chart review, history and exam, documentation and further activities as noted above.    These conclusions are made at the best of one's knowledge and belief based on the provided evidence such as patient's history and allergy test results and they can change over time or can be incomplete because of missing information's.    I explained the lab values, imagings and findings to the patient.  Patient expressed understanding I did not recognize any barriers to the understanding of the patient.    The above note was dictated using voice recognition software and may include typographical errors. Please contact the author for any clarifications.    Carlos Gracia MD  , Division of Pulmonary/Critical Care

## 2024-01-02 NOTE — PATIENT INSTRUCTIONS
"Google \"lung.org and inhaler technique\" for videos reviewing how to use different inhaler and respiratory tools     https://www.lung.org/lung-health-diseases/lung-disease-lookup/asthma/treatment/devices    Use Albuterol for coughing fits- if no improvement, then can toss. If it does work, then let me know and we can give you more refills    Use Floanse 1 spray in each nostril twice a day    Use Nasal spray Atrovent as needed for breakthrough symptoms    For throat irrigations- stuck feeling in your throat- try deep gargling     If you have improvement with the regimen above but still have throat clearing, can get speech therapy    If regimen above not helpful, can try gabapentin    Check in with a message in 3 months   "

## 2024-03-14 ENCOUNTER — PATIENT OUTREACH (OUTPATIENT)
Dept: CARE COORDINATION | Facility: CLINIC | Age: 59
End: 2024-03-14
Payer: COMMERCIAL

## 2024-05-26 ENCOUNTER — HEALTH MAINTENANCE LETTER (OUTPATIENT)
Age: 59
End: 2024-05-26

## 2025-06-14 ENCOUNTER — HEALTH MAINTENANCE LETTER (OUTPATIENT)
Age: 60
End: 2025-06-14

## (undated) DEVICE — KIT ENDO FIRST STEP DISINFECTANT 200ML W/POUCH EP-4

## (undated) DEVICE — PREP CHLORAPREP 26ML TINTED ORANGE  260815

## (undated) DEVICE — PAD CHUX UNDERPAD 23X24" 7136

## (undated) RX ORDER — FENTANYL CITRATE 50 UG/ML
INJECTION, SOLUTION INTRAMUSCULAR; INTRAVENOUS
Status: DISPENSED
Start: 2021-05-27